# Patient Record
Sex: MALE | Race: BLACK OR AFRICAN AMERICAN | HISPANIC OR LATINO | Employment: FULL TIME | URBAN - METROPOLITAN AREA
[De-identification: names, ages, dates, MRNs, and addresses within clinical notes are randomized per-mention and may not be internally consistent; named-entity substitution may affect disease eponyms.]

---

## 2019-01-17 ENCOUNTER — OFFICE VISIT (OUTPATIENT)
Dept: DERMATOLOGY | Age: 29
End: 2019-01-17

## 2019-01-17 DIAGNOSIS — L74.510 HYPERHIDROSIS OF AXILLA: Primary | ICD-10-CM

## 2019-01-17 PROCEDURE — 99202 OFFICE O/P NEW SF 15 MIN: CPT | Performed by: DERMATOLOGY

## 2019-01-24 ENCOUNTER — TELEPHONE (OUTPATIENT)
Dept: DERMATOLOGY | Age: 29
End: 2019-01-24

## 2019-01-31 ENCOUNTER — TELEPHONE (OUTPATIENT)
Dept: DERMATOLOGY | Age: 29
End: 2019-01-31

## 2019-02-11 ENCOUNTER — TELEPHONE (OUTPATIENT)
Dept: DERMATOLOGY | Age: 29
End: 2019-02-11

## 2019-02-11 RX ORDER — OXYBUTYNIN CHLORIDE 5 MG/1
5 TABLET ORAL 2 TIMES DAILY
Qty: 60 TABLET | Refills: 4 | Status: SHIPPED | OUTPATIENT
Start: 2019-02-11 | End: 2019-06-27 | Stop reason: ALTCHOICE

## 2019-03-12 ENCOUNTER — OFFICE VISIT (OUTPATIENT)
Dept: DERMATOLOGY | Age: 29
End: 2019-03-12

## 2019-03-12 DIAGNOSIS — L73.8 PSEUDOFOLLICULITIS: Primary | ICD-10-CM

## 2019-03-12 PROCEDURE — 99213 OFFICE O/P EST LOW 20 MIN: CPT | Performed by: DERMATOLOGY

## 2019-03-12 PROCEDURE — 11900 INJECT SKIN LESIONS </W 7: CPT | Performed by: DERMATOLOGY

## 2019-03-12 RX ORDER — CLINDAMYCIN PHOSPHATE 10 MG/G
GEL TOPICAL
Qty: 30 G | Refills: 1 | Status: SHIPPED | OUTPATIENT
Start: 2019-03-12 | End: 2019-12-09

## 2019-03-20 ENCOUNTER — TELEPHONE (OUTPATIENT)
Dept: DERMATOLOGY | Age: 29
End: 2019-03-20

## 2019-03-25 ENCOUNTER — OFFICE VISIT (OUTPATIENT)
Dept: DERMATOLOGY | Age: 29
End: 2019-03-25

## 2019-03-25 DIAGNOSIS — L73.8 PSEUDOFOLLICULITIS: Primary | ICD-10-CM

## 2019-03-25 PROCEDURE — 11900 INJECT SKIN LESIONS </W 7: CPT | Performed by: DERMATOLOGY

## 2019-03-25 PROCEDURE — 99213 OFFICE O/P EST LOW 20 MIN: CPT | Performed by: DERMATOLOGY

## 2019-04-09 ENCOUNTER — LAB SERVICES (OUTPATIENT)
Dept: INTERNAL MEDICINE | Age: 29
End: 2019-04-09

## 2019-04-09 ENCOUNTER — OFFICE VISIT (OUTPATIENT)
Dept: INTERNAL MEDICINE | Age: 29
End: 2019-04-09

## 2019-04-09 VITALS
WEIGHT: 168 LBS | SYSTOLIC BLOOD PRESSURE: 120 MMHG | BODY MASS INDEX: 23.52 KG/M2 | HEIGHT: 71 IN | HEART RATE: 80 BPM | DIASTOLIC BLOOD PRESSURE: 84 MMHG

## 2019-04-09 DIAGNOSIS — Z00.00 ANNUAL PHYSICAL EXAM: ICD-10-CM

## 2019-04-09 DIAGNOSIS — Z11.3 SCREENING FOR STDS (SEXUALLY TRANSMITTED DISEASES): Primary | ICD-10-CM

## 2019-04-09 DIAGNOSIS — Z11.3 SCREENING FOR STDS (SEXUALLY TRANSMITTED DISEASES): ICD-10-CM

## 2019-04-09 PROCEDURE — 87591 N.GONORRHOEAE DNA AMP PROB: CPT | Performed by: INTERNAL MEDICINE

## 2019-04-09 PROCEDURE — 87491 CHLMYD TRACH DNA AMP PROBE: CPT | Performed by: INTERNAL MEDICINE

## 2019-04-09 PROCEDURE — 87661 TRICHOMONAS VAGINALIS AMPLIF: CPT | Performed by: INTERNAL MEDICINE

## 2019-04-09 PROCEDURE — 99385 PREV VISIT NEW AGE 18-39: CPT | Performed by: INTERNAL MEDICINE

## 2019-04-09 ASSESSMENT — PATIENT HEALTH QUESTIONNAIRE - PHQ9
SUM OF ALL RESPONSES TO PHQ9 QUESTIONS 1 AND 2: 0
SUM OF ALL RESPONSES TO PHQ9 QUESTIONS 1 AND 2: 0
2. FEELING DOWN, DEPRESSED OR HOPELESS: NOT AT ALL
1. LITTLE INTEREST OR PLEASURE IN DOING THINGS: NOT AT ALL

## 2019-04-10 LAB
C TRACH RRNA SPEC QL NAA+PROBE: NEGATIVE
N GONORRHOEA RRNA SPEC QL NAA+PROBE: NEGATIVE
SPECIMEN SOURCE: NORMAL
SPECIMEN SOURCE: NORMAL
T VAGINALIS RRNA SPEC QL NAA+PROBE: NEGATIVE

## 2019-04-11 ENCOUNTER — TELEPHONE (OUTPATIENT)
Dept: INTERNAL MEDICINE | Age: 29
End: 2019-04-11

## 2019-04-15 ENCOUNTER — TELEPHONE (OUTPATIENT)
Dept: TELEHEALTH | Age: 29
End: 2019-04-15

## 2019-04-15 ENCOUNTER — E-ADVICE (OUTPATIENT)
Dept: DERMATOLOGY | Age: 29
End: 2019-04-15

## 2019-04-15 ENCOUNTER — WALK IN (OUTPATIENT)
Dept: URGENT CARE | Age: 29
End: 2019-04-15

## 2019-04-15 VITALS
DIASTOLIC BLOOD PRESSURE: 70 MMHG | HEART RATE: 55 BPM | TEMPERATURE: 98.2 F | OXYGEN SATURATION: 100 % | SYSTOLIC BLOOD PRESSURE: 126 MMHG | RESPIRATION RATE: 16 BRPM

## 2019-04-15 DIAGNOSIS — K13.0 LIP LESION: Primary | ICD-10-CM

## 2019-04-15 DIAGNOSIS — R61 HYPERHIDROSIS: ICD-10-CM

## 2019-04-15 PROCEDURE — 99213 OFFICE O/P EST LOW 20 MIN: CPT | Performed by: PHYSICIAN ASSISTANT

## 2019-04-15 RX ORDER — VALACYCLOVIR HYDROCHLORIDE 1 G/1
2000 TABLET, FILM COATED ORAL EVERY 12 HOURS
Qty: 4 TABLET | Refills: 1 | Status: SHIPPED | OUTPATIENT
Start: 2019-04-15 | End: 2019-04-16

## 2019-04-15 ASSESSMENT — ENCOUNTER SYMPTOMS
SORE THROAT: 0
COUGH: 0
CHILLS: 0

## 2019-04-16 ENCOUNTER — E-ADVICE (OUTPATIENT)
Dept: DERMATOLOGY | Age: 29
End: 2019-04-16

## 2019-04-17 ENCOUNTER — TELEPHONE (OUTPATIENT)
Dept: DERMATOLOGY | Age: 29
End: 2019-04-17

## 2019-04-23 ENCOUNTER — TELEPHONE (OUTPATIENT)
Dept: DERMATOLOGY | Age: 29
End: 2019-04-23

## 2019-04-30 ENCOUNTER — E-ADVICE (OUTPATIENT)
Dept: DERMATOLOGY | Age: 29
End: 2019-04-30

## 2019-05-02 ENCOUNTER — OFFICE VISIT (OUTPATIENT)
Dept: DERMATOLOGY | Age: 29
End: 2019-05-02

## 2019-05-02 DIAGNOSIS — L74.510 HYPERHIDROSIS OF AXILLA: Primary | ICD-10-CM

## 2019-05-02 PROCEDURE — 64650 CHEMODENERV ECCRINE GLANDS: CPT | Performed by: DERMATOLOGY

## 2019-06-23 ENCOUNTER — WALK IN (OUTPATIENT)
Dept: URGENT CARE | Age: 29
End: 2019-06-23

## 2019-06-23 VITALS
OXYGEN SATURATION: 100 % | DIASTOLIC BLOOD PRESSURE: 66 MMHG | HEART RATE: 88 BPM | SYSTOLIC BLOOD PRESSURE: 120 MMHG | TEMPERATURE: 98.5 F | RESPIRATION RATE: 16 BRPM

## 2019-06-23 DIAGNOSIS — S86.011A RUPTURE OF RIGHT ACHILLES TENDON, INITIAL ENCOUNTER: Primary | ICD-10-CM

## 2019-06-23 DIAGNOSIS — S89.90XA INJURY OF CALF: ICD-10-CM

## 2019-06-23 PROCEDURE — 99202 OFFICE O/P NEW SF 15 MIN: CPT | Performed by: FAMILY MEDICINE

## 2019-06-23 RX ORDER — IBUPROFEN 800 MG/1
800 TABLET ORAL ONCE
Status: COMPLETED | OUTPATIENT
Start: 2019-06-23 | End: 2019-06-23

## 2019-06-23 RX ORDER — ACETAMINOPHEN AND CODEINE PHOSPHATE 300; 30 MG/1; MG/1
1-2 TABLET ORAL EVERY 6 HOURS PRN
Qty: 30 TABLET | Refills: 0 | Status: SHIPPED | OUTPATIENT
Start: 2019-06-23 | End: 2019-12-09

## 2019-06-23 RX ADMIN — IBUPROFEN 800 MG: 800 TABLET ORAL at 14:00

## 2019-06-23 ASSESSMENT — PAIN SCALES - GENERAL: PAIN_LEVEL_AT_REST: 8

## 2019-06-24 ENCOUNTER — TELEPHONE (OUTPATIENT)
Dept: URGENT CARE | Age: 29
End: 2019-06-24

## 2019-06-25 ENCOUNTER — TELEPHONE (OUTPATIENT)
Dept: INTERNAL MEDICINE | Age: 29
End: 2019-06-25

## 2019-06-26 ENCOUNTER — OFFICE VISIT (OUTPATIENT)
Dept: ORTHOPEDICS | Age: 29
End: 2019-06-26
Attending: FAMILY MEDICINE

## 2019-06-26 DIAGNOSIS — S86.011A RUPTURE OF RIGHT ACHILLES TENDON, INITIAL ENCOUNTER: Primary | ICD-10-CM

## 2019-06-26 PROCEDURE — 99203 OFFICE O/P NEW LOW 30 MIN: CPT | Performed by: ORTHOPAEDIC SURGERY

## 2019-06-27 ASSESSMENT — ENCOUNTER SYMPTOMS
PSYCHIATRIC NEGATIVE: 1
EYES NEGATIVE: 1
BRUISES/BLEEDS EASILY: 1
DIAPHORESIS: 1
RESPIRATORY NEGATIVE: 1
CONSTIPATION: 1
ENDOCRINE NEGATIVE: 1

## 2019-06-30 ENCOUNTER — ANESTHESIA EVENT (OUTPATIENT)
Dept: SURGERY | Age: 29
End: 2019-06-30

## 2019-07-01 ENCOUNTER — HOSPITAL ENCOUNTER (OUTPATIENT)
Age: 29
Discharge: HOME | End: 2019-07-01
Attending: ORTHOPAEDIC SURGERY | Admitting: ORTHOPAEDIC SURGERY

## 2019-07-01 ENCOUNTER — ANESTHESIA (OUTPATIENT)
Dept: SURGERY | Age: 29
End: 2019-07-01

## 2019-07-01 VITALS
HEART RATE: 58 BPM | OXYGEN SATURATION: 100 % | HEIGHT: 71 IN | BODY MASS INDEX: 23.24 KG/M2 | RESPIRATION RATE: 18 BRPM | SYSTOLIC BLOOD PRESSURE: 140 MMHG | WEIGHT: 166.01 LBS | TEMPERATURE: 97.9 F | DIASTOLIC BLOOD PRESSURE: 72 MMHG

## 2019-07-01 LAB
INR PPP: 1.1
PROTHROMBIN TIME: 11 SEC (ref 9.7–11.8)

## 2019-07-01 PROCEDURE — 10002358 HB ANESTH GENERAL 1ST 1/2 HR: Performed by: ORTHOPAEDIC SURGERY

## 2019-07-01 PROCEDURE — 10002117 HB ADDITIONAL SURGERY TIME/30 MIN: Performed by: ORTHOPAEDIC SURGERY

## 2019-07-01 PROCEDURE — 27650 REPAIR ACHILLES TENDON: CPT | Performed by: ANESTHESIOLOGY

## 2019-07-01 PROCEDURE — 10004348 HB COUNTER-EVAL PRE-OP: Performed by: ORTHOPAEDIC SURGERY

## 2019-07-01 PROCEDURE — 10001383 HB ANKLE/FOOT PROCEDURES 2: Performed by: ORTHOPAEDIC SURGERY

## 2019-07-01 PROCEDURE — 85610 PROTHROMBIN TIME: CPT

## 2019-07-01 PROCEDURE — 10002801 HB RX 250 W/O HCPCS: Performed by: ORTHOPAEDIC SURGERY

## 2019-07-01 PROCEDURE — 10002801 HB RX 250 W/O HCPCS: Performed by: ANESTHESIOLOGIST ASSISTANT

## 2019-07-01 PROCEDURE — 10004560 HB COUNTER-EXTENDED RECOVERY PER HOUR: Performed by: ORTHOPAEDIC SURGERY

## 2019-07-01 PROCEDURE — 10002803 HB RX 637: Performed by: ORTHOPAEDIC SURGERY

## 2019-07-01 PROCEDURE — 10004316 HB COUNTER-PREP: Performed by: ORTHOPAEDIC SURGERY

## 2019-07-01 PROCEDURE — 10004451 HB PACU RECOVERY 1ST 30 MINUTES: Performed by: ORTHOPAEDIC SURGERY

## 2019-07-01 PROCEDURE — 10002807 HB RX 258: Performed by: ORTHOPAEDIC SURGERY

## 2019-07-01 PROCEDURE — 10002800 HB RX 250 W HCPCS: Performed by: ANESTHESIOLOGIST ASSISTANT

## 2019-07-01 PROCEDURE — 36415 COLL VENOUS BLD VENIPUNCTURE: CPT

## 2019-07-01 PROCEDURE — 10002767 HB EXTENDED RECOVERY PER HOUR: Performed by: ORTHOPAEDIC SURGERY

## 2019-07-01 PROCEDURE — 10002807 HB RX 258: Performed by: ANESTHESIOLOGY

## 2019-07-01 PROCEDURE — 10004452 HB PACU ADDL 30 MINUTES: Performed by: ORTHOPAEDIC SURGERY

## 2019-07-01 PROCEDURE — 10002800 HB RX 250 W HCPCS: Performed by: ANESTHESIOLOGY

## 2019-07-01 PROCEDURE — 10004651 HB RX, NO CHARGE ITEM: Performed by: ORTHOPAEDIC SURGERY

## 2019-07-01 PROCEDURE — 27650 REPAIR ACHILLES TENDON: CPT | Performed by: ANESTHESIOLOGIST ASSISTANT

## 2019-07-01 PROCEDURE — 10002359 HB ANESTH GENERAL ADD'L 1/2 HR: Performed by: ORTHOPAEDIC SURGERY

## 2019-07-01 PROCEDURE — 10002800 HB RX 250 W HCPCS: Performed by: ORTHOPAEDIC SURGERY

## 2019-07-01 RX ORDER — OXYCODONE HYDROCHLORIDE 5 MG/1
5-15 TABLET ORAL
Qty: 40 TABLET | Refills: 0 | Status: SHIPPED | OUTPATIENT
Start: 2019-07-01 | End: 2019-12-09

## 2019-07-01 RX ORDER — 0.9 % SODIUM CHLORIDE 0.9 %
2 VIAL (ML) INJECTION EVERY 12 HOURS SCHEDULED
Status: DISCONTINUED | OUTPATIENT
Start: 2019-07-01 | End: 2019-07-01 | Stop reason: HOSPADM

## 2019-07-01 RX ORDER — NALOXONE HCL 0.4 MG/ML
0.2 VIAL (ML) INJECTION EVERY 5 MIN PRN
Status: DISCONTINUED | OUTPATIENT
Start: 2019-07-01 | End: 2019-07-01 | Stop reason: HOSPADM

## 2019-07-01 RX ORDER — SODIUM CHLORIDE, SODIUM LACTATE, POTASSIUM CHLORIDE, CALCIUM CHLORIDE 600; 310; 30; 20 MG/100ML; MG/100ML; MG/100ML; MG/100ML
INJECTION, SOLUTION INTRAVENOUS CONTINUOUS
Status: DISCONTINUED | OUTPATIENT
Start: 2019-07-01 | End: 2019-07-01 | Stop reason: HOSPADM

## 2019-07-01 RX ORDER — MIDAZOLAM HYDROCHLORIDE 1 MG/ML
INJECTION, SOLUTION INTRAMUSCULAR; INTRAVENOUS PRN
Status: DISCONTINUED | OUTPATIENT
Start: 2019-07-01 | End: 2019-07-01

## 2019-07-01 RX ORDER — CEFAZOLIN SODIUM/WATER 2 G/20 ML
2000 SYRINGE (ML) INTRAVENOUS ONCE
Status: COMPLETED | OUTPATIENT
Start: 2019-07-01 | End: 2019-07-01

## 2019-07-01 RX ORDER — ONDANSETRON 2 MG/ML
INJECTION INTRAMUSCULAR; INTRAVENOUS PRN
Status: DISCONTINUED | OUTPATIENT
Start: 2019-07-01 | End: 2019-07-01

## 2019-07-01 RX ORDER — LIDOCAINE HYDROCHLORIDE 10 MG/ML
INJECTION, SOLUTION INFILTRATION; PERINEURAL PRN
Status: DISCONTINUED | OUTPATIENT
Start: 2019-07-01 | End: 2019-07-01

## 2019-07-01 RX ORDER — ONDANSETRON 2 MG/ML
4 INJECTION INTRAMUSCULAR; INTRAVENOUS 2 TIMES DAILY PRN
Status: DISCONTINUED | OUTPATIENT
Start: 2019-07-01 | End: 2019-07-01 | Stop reason: HOSPADM

## 2019-07-01 RX ORDER — LIDOCAINE HYDROCHLORIDE 40 MG/ML
SOLUTION TOPICAL PRN
Status: DISCONTINUED | OUTPATIENT
Start: 2019-07-01 | End: 2019-07-01

## 2019-07-01 RX ORDER — DIPHENHYDRAMINE HYDROCHLORIDE 50 MG/ML
25 INJECTION INTRAMUSCULAR; INTRAVENOUS EVERY 4 HOURS PRN
Status: DISCONTINUED | OUTPATIENT
Start: 2019-07-01 | End: 2019-07-01 | Stop reason: HOSPADM

## 2019-07-01 RX ORDER — DEXTROSE MONOHYDRATE 50 MG/ML
INJECTION, SOLUTION INTRAVENOUS CONTINUOUS PRN
Status: DISCONTINUED | OUTPATIENT
Start: 2019-07-01 | End: 2019-07-01 | Stop reason: HOSPADM

## 2019-07-01 RX ORDER — ROCURONIUM BROMIDE 10 MG/ML
INJECTION, SOLUTION INTRAVENOUS PRN
Status: DISCONTINUED | OUTPATIENT
Start: 2019-07-01 | End: 2019-07-01

## 2019-07-01 RX ORDER — ACETAMINOPHEN 325 MG/1
650 TABLET ORAL ONCE
Status: COMPLETED | OUTPATIENT
Start: 2019-07-01 | End: 2019-07-01

## 2019-07-01 RX ORDER — CELECOXIB 200 MG/1
200 CAPSULE ORAL ONCE
Status: COMPLETED | OUTPATIENT
Start: 2019-07-01 | End: 2019-07-01

## 2019-07-01 RX ORDER — OXYCODONE HYDROCHLORIDE AND ACETAMINOPHEN 5; 325 MG/1; MG/1
1-2 TABLET ORAL EVERY 4 HOURS PRN
Status: DISCONTINUED | OUTPATIENT
Start: 2019-07-01 | End: 2019-07-01 | Stop reason: HOSPADM

## 2019-07-01 RX ORDER — ACETAMINOPHEN 325 MG/1
650 TABLET ORAL EVERY 4 HOURS PRN
Status: DISCONTINUED | OUTPATIENT
Start: 2019-07-01 | End: 2019-07-01 | Stop reason: HOSPADM

## 2019-07-01 RX ORDER — LIDOCAINE HYDROCHLORIDE 10 MG/ML
5-10 INJECTION, SOLUTION INFILTRATION; PERINEURAL PRN
Status: DISCONTINUED | OUTPATIENT
Start: 2019-07-01 | End: 2019-07-01 | Stop reason: HOSPADM

## 2019-07-01 RX ORDER — DIPHENHYDRAMINE HYDROCHLORIDE 50 MG/ML
25 INJECTION INTRAMUSCULAR; INTRAVENOUS
Status: DISCONTINUED | OUTPATIENT
Start: 2019-07-01 | End: 2019-07-01 | Stop reason: HOSPADM

## 2019-07-01 RX ORDER — HUMAN INSULIN 100 [IU]/ML
INJECTION, SOLUTION SUBCUTANEOUS
Status: DISCONTINUED | OUTPATIENT
Start: 2019-07-01 | End: 2019-07-01 | Stop reason: HOSPADM

## 2019-07-01 RX ORDER — LIDOCAINE AND PRILOCAINE 25; 25 MG/G; MG/G
1 CREAM TOPICAL PRN
Status: DISCONTINUED | OUTPATIENT
Start: 2019-07-01 | End: 2019-07-01 | Stop reason: HOSPADM

## 2019-07-01 RX ORDER — METOCLOPRAMIDE HYDROCHLORIDE 5 MG/ML
5 INJECTION INTRAMUSCULAR; INTRAVENOUS EVERY 6 HOURS PRN
Status: DISCONTINUED | OUTPATIENT
Start: 2019-07-01 | End: 2019-07-01 | Stop reason: HOSPADM

## 2019-07-01 RX ORDER — PROPOFOL 10 MG/ML
INJECTION, EMULSION INTRAVENOUS PRN
Status: DISCONTINUED | OUTPATIENT
Start: 2019-07-01 | End: 2019-07-01

## 2019-07-01 RX ORDER — PROCHLORPERAZINE EDISYLATE 5 MG/ML
5 INJECTION INTRAMUSCULAR; INTRAVENOUS EVERY 4 HOURS PRN
Status: DISCONTINUED | OUTPATIENT
Start: 2019-07-01 | End: 2019-07-01 | Stop reason: HOSPADM

## 2019-07-01 RX ORDER — DIPHENHYDRAMINE HCL 25 MG
25 CAPSULE ORAL EVERY 6 HOURS PRN
Status: DISCONTINUED | OUTPATIENT
Start: 2019-07-01 | End: 2019-07-01 | Stop reason: HOSPADM

## 2019-07-01 RX ORDER — 0.9 % SODIUM CHLORIDE 0.9 %
2 VIAL (ML) INJECTION PRN
Status: DISCONTINUED | OUTPATIENT
Start: 2019-07-01 | End: 2019-07-01 | Stop reason: HOSPADM

## 2019-07-01 RX ORDER — BUPIVACAINE HYDROCHLORIDE AND EPINEPHRINE 2.5; 5 MG/ML; UG/ML
INJECTION, SOLUTION EPIDURAL; INFILTRATION; INTRACAUDAL; PERINEURAL PRN
Status: DISCONTINUED | OUTPATIENT
Start: 2019-07-01 | End: 2019-07-01 | Stop reason: HOSPADM

## 2019-07-01 RX ADMIN — TAPENTADOL HYDROCHLORIDE 100 MG: 50 TABLET, FILM COATED, EXTENDED RELEASE ORAL at 09:04

## 2019-07-01 RX ADMIN — FENTANYL CITRATE 25 MCG: 50 INJECTION INTRAMUSCULAR; INTRAVENOUS at 13:09

## 2019-07-01 RX ADMIN — Medication 2000 MG: at 11:31

## 2019-07-01 RX ADMIN — LIDOCAINE HYDROCHLORIDE 50 MG: 10 INJECTION, SOLUTION INFILTRATION; PERINEURAL at 11:22

## 2019-07-01 RX ADMIN — FENTANYL CITRATE 25 MCG: 50 INJECTION INTRAMUSCULAR; INTRAVENOUS at 13:18

## 2019-07-01 RX ADMIN — LIDOCAINE HYDROCHLORIDE 1 APPLICATION.: 40 SOLUTION TOPICAL at 11:27

## 2019-07-01 RX ADMIN — FENTANYL CITRATE 25 MCG: 50 INJECTION INTRAMUSCULAR; INTRAVENOUS at 13:35

## 2019-07-01 RX ADMIN — ONDANSETRON 4 MG: 2 INJECTION INTRAMUSCULAR; INTRAVENOUS at 12:15

## 2019-07-01 RX ADMIN — FENTANYL CITRATE 100 MCG: 50 INJECTION INTRAMUSCULAR; INTRAVENOUS at 11:20

## 2019-07-01 RX ADMIN — SODIUM CHLORIDE, POTASSIUM CHLORIDE, SODIUM LACTATE AND CALCIUM CHLORIDE: 600; 310; 30; 20 INJECTION, SOLUTION INTRAVENOUS at 10:26

## 2019-07-01 RX ADMIN — ROCURONIUM BROMIDE 70 MG: 10 INJECTION INTRAVENOUS at 11:24

## 2019-07-01 RX ADMIN — ACETAMINOPHEN 650 MG: 325 TABLET ORAL at 09:03

## 2019-07-01 RX ADMIN — MIDAZOLAM HYDROCHLORIDE 2 MG: 1 INJECTION, SOLUTION INTRAMUSCULAR; INTRAVENOUS at 11:16

## 2019-07-01 RX ADMIN — SUGAMMADEX 200 MG: 100 INJECTION, SOLUTION INTRAVENOUS at 12:41

## 2019-07-01 RX ADMIN — CEFAZOLIN 1000 MG: 1 INJECTION, POWDER, FOR SOLUTION INTRAMUSCULAR; INTRAVENOUS; PARENTERAL at 13:14

## 2019-07-01 RX ADMIN — PROPOFOL 200 MG: 10 INJECTION, EMULSION INTRAVENOUS at 11:23

## 2019-07-01 RX ADMIN — FENTANYL CITRATE 25 MCG: 50 INJECTION INTRAMUSCULAR; INTRAVENOUS at 13:30

## 2019-07-01 RX ADMIN — SODIUM CHLORIDE, POTASSIUM CHLORIDE, SODIUM LACTATE AND CALCIUM CHLORIDE: 600; 310; 30; 20 INJECTION, SOLUTION INTRAVENOUS at 12:44

## 2019-07-01 RX ADMIN — PROPOFOL 50 MCG/KG/MIN: 10 INJECTION, EMULSION INTRAVENOUS at 11:34

## 2019-07-01 RX ADMIN — CELECOXIB 200 MG: 200 CAPSULE ORAL at 09:04

## 2019-07-01 ASSESSMENT — PAIN SCALES - GENERAL
PAIN_LEVEL_AT_REST: 0
PAIN_LEVEL_AT_REST: 0
PAIN_LEVEL_AT_REST: 3
PAIN_LEVEL_AT_REST: 5
PAIN_LEVEL_AT_REST: 0
PAIN_LEVEL_AT_REST: 3
PAIN_LEVEL_AT_REST: 4
PAIN_LEVEL_AT_REST: 4
PAIN_LEVEL_AT_REST: 3
PAIN_LEVEL_AT_REST: 4

## 2019-07-01 ASSESSMENT — MOVEMENT AND STRENGTH ASSESSMENTS
RLE_ASSESSMENT: TRACE/PALPABLE CONTRACTION OF MUSCLE, NO JOINT MOTION
RLE_ASSESSMENT: TRACE/PALPABLE CONTRACTION OF MUSCLE, NO JOINT MOTION
LLE_ASSESSMENT: NORMAL/COMPLETE ROM AGAINST GRAVITY WITH FULL RESISTANCE
LLE_ASSESSMENT: NORMAL/COMPLETE ROM AGAINST GRAVITY WITH FULL RESISTANCE

## 2019-07-01 ASSESSMENT — ACTIVITIES OF DAILY LIVING (ADL)
HISTORY OF FALLING IN THE LAST YEAR (PRIOR TO ADMISSION): NO
ADL_SHORT_OF_BREATH: NO
RECENT_DECLINE_ADL: NO
ADL_BEFORE_ADMISSION: INDEPENDENT
NEEDS_ASSIST: NO
CHRONIC_PAIN_PRESENT: NO
MOBILITY_ASSIST_DEVICES: CRUTCHES;WHEELCHAIR
ADL_SCORE: 12

## 2019-07-01 ASSESSMENT — COGNITIVE AND FUNCTIONAL STATUS - GENERAL
ARE YOU BLIND OR DO YOU HAVE SERIOUS DIFFICULTY SEEING, EVEN WHEN WEARING GLASSES: NO
ARE YOU DEAF OR DO YOU HAVE SERIOUS DIFFICULTY  HEARING: NO

## 2019-07-25 ENCOUNTER — HOSPITAL ENCOUNTER (OUTPATIENT)
Dept: REHABILITATION | Age: 29
Discharge: STILL ADMITTED OR ADMINISTRATIVE | End: 2019-07-25
Attending: ORTHOPAEDIC SURGERY

## 2019-07-25 DIAGNOSIS — S86.011A ACHILLES TENDON TEAR, RIGHT, INITIAL ENCOUNTER: ICD-10-CM

## 2019-07-25 DIAGNOSIS — S86.011A ACHILLES TENDON TEAR, RIGHT, INITIAL ENCOUNTER: Primary | ICD-10-CM

## 2019-07-25 PROCEDURE — 10004173 HB COUNTER-THERAPY VISIT PT: Performed by: PHYSICAL THERAPIST

## 2019-07-25 PROCEDURE — 97110 THERAPEUTIC EXERCISES: CPT | Performed by: PHYSICAL THERAPIST

## 2019-07-25 PROCEDURE — 97162 PT EVAL MOD COMPLEX 30 MIN: CPT | Performed by: PHYSICAL THERAPIST

## 2019-07-25 ASSESSMENT — MOVEMENT AND STRENGTH ASSESSMENTS
RUNNING ON EVEN GROUND: EXTREME DIFFICULTY OR UNABLE TO PERFORM ACTIVITY
ROLLING OVER IN BED: NO DIFFICULTY
WALKING A MILE: EXTREME DIFFICULTY OR UNABLE TO PERFORM ACTIVITY
RUNNING ON UNEVEN GROUND: EXTREME DIFFICULTY OR UNABLE TO PERFORM ACTIVITY
TOTAL SCORE: 40
LIFTING AN OBJECT, LIKE A BAG OF GROCERIES, FROM THE FLOOR: A LITTLE BIT OF DIFFICULTY
WALKING BETWEEN ROOMS: NO DIFFICULTY
PERFORMING LIGHT ACTIVITES AROUND YOUR HOME: A LITTLE BIT OF DIFFICULTY
STANDING FOR 1 HOUR: EXTREME DIFFICULTY OR UNABLE TO PERFORM ACTIVITY
SQUATTING: QUITE A BIT OF DIFFICULTY
WALKING 2 BLOCKS: EXTREME DIFFICULTY OR UNABLE TO PERFORM ACTIVITY
MAKING SHARP TURNS WHILE RUNNING FAST: EX
GETTING INTO OR OUT OF A CAR: MODERATE DIFFICULTY
PERFORMING HEAVY ACTIVITIES AROUND YOUR HOME: MODERATE DIFFICULTY
PUTTING ON YOUR SHOES OR SOCKS: A LITTLE BIT OF DIFFICULTY
GETTING INTO OR OUT OF THE BATH: A LITTLE BIT OF DIFFICULTY
SITTING FOR 1 HOUR: NO DIFFICULTY
YOUR USUAL HOBBIES, RECREATIONAL OR SPORTING ACTIVIITIES: EXTREME DIFFICULTY OR UNABLE TO PERFORM ACTIVITY
HOPPING: EXTREME DIFFICULTY OR UNABLE TO PERFORM ACTIVITY
GOING UP OR DOWN 10 STAIRS (ABOUT 1 FLIGHT OF STAIRS): QUITE A BIT OF DIFFICULTY
ANY OF YOUR USUAL WORK, HOUSEWORK OR SCHOOL ACTIVITIES: MODERATE DIFFICULTY

## 2019-07-31 ENCOUNTER — HOSPITAL ENCOUNTER (OUTPATIENT)
Dept: REHABILITATION | Age: 29
Discharge: STILL ADMITTED OR ADMINISTRATIVE | End: 2019-07-31
Attending: ORTHOPAEDIC SURGERY

## 2019-07-31 PROCEDURE — 97110 THERAPEUTIC EXERCISES: CPT | Performed by: PHYSICAL THERAPIST

## 2019-07-31 PROCEDURE — 10004173 HB COUNTER-THERAPY VISIT PT: Performed by: PHYSICAL THERAPIST

## 2019-07-31 PROCEDURE — 97140 MANUAL THERAPY 1/> REGIONS: CPT | Performed by: PHYSICAL THERAPIST

## 2019-08-02 ENCOUNTER — HOSPITAL ENCOUNTER (OUTPATIENT)
Dept: REHABILITATION | Age: 29
Discharge: STILL ADMITTED OR ADMINISTRATIVE | End: 2019-08-02
Attending: ORTHOPAEDIC SURGERY

## 2019-08-02 PROCEDURE — 97110 THERAPEUTIC EXERCISES: CPT | Performed by: PHYSICAL THERAPIST

## 2019-08-02 PROCEDURE — 10004173 HB COUNTER-THERAPY VISIT PT: Performed by: PHYSICAL THERAPIST

## 2019-08-06 ENCOUNTER — HOSPITAL ENCOUNTER (OUTPATIENT)
Dept: REHABILITATION | Age: 29
Discharge: STILL ADMITTED OR ADMINISTRATIVE | End: 2019-08-06
Attending: ORTHOPAEDIC SURGERY

## 2019-08-06 PROCEDURE — 10004173 HB COUNTER-THERAPY VISIT PT: Performed by: PHYSICAL THERAPIST

## 2019-08-06 PROCEDURE — 97110 THERAPEUTIC EXERCISES: CPT | Performed by: PHYSICAL THERAPIST

## 2019-08-07 ENCOUNTER — TELEPHONE (OUTPATIENT)
Dept: DERMATOLOGY | Age: 29
End: 2019-08-07

## 2019-08-08 ENCOUNTER — HOSPITAL ENCOUNTER (OUTPATIENT)
Dept: REHABILITATION | Age: 29
Discharge: STILL ADMITTED OR ADMINISTRATIVE | End: 2019-08-08
Attending: ORTHOPAEDIC SURGERY

## 2019-08-08 PROCEDURE — 10004173 HB COUNTER-THERAPY VISIT PT: Performed by: PHYSICAL THERAPIST

## 2019-08-08 PROCEDURE — 97110 THERAPEUTIC EXERCISES: CPT | Performed by: PHYSICAL THERAPIST

## 2019-08-08 PROCEDURE — 97140 MANUAL THERAPY 1/> REGIONS: CPT | Performed by: PHYSICAL THERAPIST

## 2019-08-11 ENCOUNTER — TELEPHONE (OUTPATIENT)
Dept: URGENT CARE | Age: 29
End: 2019-08-11

## 2019-08-12 ENCOUNTER — OFFICE VISIT (OUTPATIENT)
Dept: DERMATOLOGY | Age: 29
End: 2019-08-12

## 2019-08-12 DIAGNOSIS — L74.510 HYPERHIDROSIS OF AXILLA: Primary | ICD-10-CM

## 2019-08-12 PROCEDURE — 64650 CHEMODENERV ECCRINE GLANDS: CPT | Performed by: DERMATOLOGY

## 2019-08-13 ENCOUNTER — HOSPITAL ENCOUNTER (OUTPATIENT)
Dept: REHABILITATION | Age: 29
Discharge: STILL ADMITTED OR ADMINISTRATIVE | End: 2019-08-13
Attending: ORTHOPAEDIC SURGERY

## 2019-08-13 PROCEDURE — 97110 THERAPEUTIC EXERCISES: CPT | Performed by: PHYSICAL THERAPIST

## 2019-08-13 PROCEDURE — 10004173 HB COUNTER-THERAPY VISIT PT: Performed by: PHYSICAL THERAPIST

## 2019-08-15 ENCOUNTER — TELEPHONE (OUTPATIENT)
Dept: GENERAL RADIOLOGY | Age: 29
End: 2019-08-15

## 2019-08-15 ENCOUNTER — HOSPITAL ENCOUNTER (OUTPATIENT)
Dept: REHABILITATION | Age: 29
Discharge: STILL ADMITTED OR ADMINISTRATIVE | End: 2019-08-15
Attending: ORTHOPAEDIC SURGERY

## 2019-08-15 PROCEDURE — 10004173 HB COUNTER-THERAPY VISIT PT: Performed by: PHYSICAL THERAPIST

## 2019-08-15 PROCEDURE — 97110 THERAPEUTIC EXERCISES: CPT | Performed by: PHYSICAL THERAPIST

## 2019-08-20 ENCOUNTER — APPOINTMENT (OUTPATIENT)
Dept: REHABILITATION | Age: 29
End: 2019-08-20
Attending: ORTHOPAEDIC SURGERY

## 2019-08-22 ENCOUNTER — HOSPITAL ENCOUNTER (OUTPATIENT)
Dept: REHABILITATION | Age: 29
Discharge: STILL ADMITTED OR ADMINISTRATIVE | End: 2019-08-22
Attending: ORTHOPAEDIC SURGERY

## 2019-08-22 PROCEDURE — 10004173 HB COUNTER-THERAPY VISIT PT: Performed by: PHYSICAL THERAPIST

## 2019-08-22 PROCEDURE — 97110 THERAPEUTIC EXERCISES: CPT | Performed by: PHYSICAL THERAPIST

## 2019-08-28 ENCOUNTER — HOSPITAL ENCOUNTER (OUTPATIENT)
Dept: REHABILITATION | Age: 29
Discharge: STILL ADMITTED OR ADMINISTRATIVE | End: 2019-08-28
Attending: ORTHOPAEDIC SURGERY

## 2019-08-28 PROCEDURE — 97112 NEUROMUSCULAR REEDUCATION: CPT | Performed by: PHYSICAL THERAPIST

## 2019-08-28 PROCEDURE — 97140 MANUAL THERAPY 1/> REGIONS: CPT | Performed by: PHYSICAL THERAPIST

## 2019-08-28 PROCEDURE — 10004173 HB COUNTER-THERAPY VISIT PT: Performed by: PHYSICAL THERAPIST

## 2019-08-28 PROCEDURE — 97110 THERAPEUTIC EXERCISES: CPT | Performed by: PHYSICAL THERAPIST

## 2019-08-30 ENCOUNTER — APPOINTMENT (OUTPATIENT)
Dept: REHABILITATION | Age: 29
End: 2019-08-30
Attending: ORTHOPAEDIC SURGERY

## 2019-09-05 ENCOUNTER — HOSPITAL ENCOUNTER (OUTPATIENT)
Dept: REHABILITATION | Age: 29
Discharge: STILL ADMITTED OR ADMINISTRATIVE | End: 2019-09-05
Attending: ORTHOPAEDIC SURGERY

## 2019-09-05 PROCEDURE — 97110 THERAPEUTIC EXERCISES: CPT | Performed by: PHYSICAL THERAPIST

## 2019-09-05 PROCEDURE — 10004173 HB COUNTER-THERAPY VISIT PT: Performed by: PHYSICAL THERAPIST

## 2019-09-09 ENCOUNTER — HOSPITAL ENCOUNTER (OUTPATIENT)
Dept: REHABILITATION | Age: 29
Discharge: STILL ADMITTED OR ADMINISTRATIVE | End: 2019-09-09
Attending: ORTHOPAEDIC SURGERY

## 2019-09-09 PROCEDURE — 10004173 HB COUNTER-THERAPY VISIT PT: Performed by: PHYSICAL THERAPIST

## 2019-09-09 PROCEDURE — 97110 THERAPEUTIC EXERCISES: CPT | Performed by: PHYSICAL THERAPIST

## 2019-09-10 ENCOUNTER — LAB SERVICES (OUTPATIENT)
Dept: INTERNAL MEDICINE | Age: 29
End: 2019-09-10

## 2019-09-10 ENCOUNTER — OFFICE VISIT (OUTPATIENT)
Dept: INTERNAL MEDICINE | Age: 29
End: 2019-09-10

## 2019-09-10 VITALS
HEART RATE: 91 BPM | SYSTOLIC BLOOD PRESSURE: 110 MMHG | HEIGHT: 71 IN | BODY MASS INDEX: 22.82 KG/M2 | DIASTOLIC BLOOD PRESSURE: 80 MMHG | WEIGHT: 163 LBS

## 2019-09-10 DIAGNOSIS — Z11.3 SCREENING FOR STDS (SEXUALLY TRANSMITTED DISEASES): Primary | ICD-10-CM

## 2019-09-10 DIAGNOSIS — Z11.3 SCREENING FOR STDS (SEXUALLY TRANSMITTED DISEASES): ICD-10-CM

## 2019-09-10 PROCEDURE — 86694 HERPES SIMPLEX NES ANTBDY: CPT | Performed by: INTERNAL MEDICINE

## 2019-09-10 PROCEDURE — 86696 HERPES SIMPLEX TYPE 2 TEST: CPT | Performed by: INTERNAL MEDICINE

## 2019-09-10 PROCEDURE — 36415 COLL VENOUS BLD VENIPUNCTURE: CPT | Performed by: INTERNAL MEDICINE

## 2019-09-10 PROCEDURE — 87389 HIV-1 AG W/HIV-1&-2 AB AG IA: CPT | Performed by: INTERNAL MEDICINE

## 2019-09-10 PROCEDURE — 99213 OFFICE O/P EST LOW 20 MIN: CPT | Performed by: INTERNAL MEDICINE

## 2019-09-10 PROCEDURE — 86695 HERPES SIMPLEX TYPE 1 TEST: CPT | Performed by: INTERNAL MEDICINE

## 2019-09-11 LAB
HIV 1+2 AB+HIV1 P24 AG SERPL QL IA: NONREACTIVE
HSV IGM SER-ACNC: 0.46
HSV1 GG IGG SER-ACNC: POSITIVE
HSV1 IGG SERPL QL IA: POSITIVE
HSV2 GG IGG SER-ACNC: NEGATIVE
HSV2 IGG SERPL QL IA: POSITIVE

## 2019-09-13 ENCOUNTER — E-ADVICE (OUTPATIENT)
Dept: INTERNAL MEDICINE | Age: 29
End: 2019-09-13

## 2019-09-24 ENCOUNTER — HOSPITAL ENCOUNTER (OUTPATIENT)
Dept: REHABILITATION | Age: 29
Discharge: STILL ADMITTED OR ADMINISTRATIVE | End: 2019-09-24
Attending: ORTHOPAEDIC SURGERY

## 2019-09-24 PROCEDURE — 97140 MANUAL THERAPY 1/> REGIONS: CPT | Performed by: PHYSICAL THERAPIST

## 2019-09-24 PROCEDURE — 97110 THERAPEUTIC EXERCISES: CPT | Performed by: PHYSICAL THERAPIST

## 2019-09-24 PROCEDURE — 10004173 HB COUNTER-THERAPY VISIT PT: Performed by: PHYSICAL THERAPIST

## 2019-09-27 ENCOUNTER — APPOINTMENT (OUTPATIENT)
Dept: REHABILITATION | Age: 29
End: 2019-09-27
Attending: ORTHOPAEDIC SURGERY

## 2019-11-05 ENCOUNTER — TELEPHONE (OUTPATIENT)
Dept: TELEHEALTH | Age: 29
End: 2019-11-05

## 2019-11-13 ENCOUNTER — OFFICE VISIT (OUTPATIENT)
Dept: DERMATOLOGY | Age: 29
End: 2019-11-13

## 2019-11-13 DIAGNOSIS — L74.510 HYPERHIDROSIS OF AXILLA: Primary | ICD-10-CM

## 2019-11-13 DIAGNOSIS — T30.0 THERMAL BURN: ICD-10-CM

## 2019-11-13 PROCEDURE — 64650 CHEMODENERV ECCRINE GLANDS: CPT | Performed by: DERMATOLOGY

## 2019-11-13 PROCEDURE — 99212 OFFICE O/P EST SF 10 MIN: CPT | Performed by: DERMATOLOGY

## 2019-12-06 ENCOUNTER — HOSPITAL ENCOUNTER (EMERGENCY)
Age: 29
Discharge: HOME | End: 2019-12-06
Attending: EMERGENCY MEDICINE

## 2019-12-06 ENCOUNTER — APPOINTMENT (OUTPATIENT)
Dept: CT IMAGING | Age: 29
End: 2019-12-06

## 2019-12-06 VITALS
DIASTOLIC BLOOD PRESSURE: 83 MMHG | OXYGEN SATURATION: 100 % | HEART RATE: 67 BPM | TEMPERATURE: 98.5 F | SYSTOLIC BLOOD PRESSURE: 142 MMHG | RESPIRATION RATE: 16 BRPM

## 2019-12-06 DIAGNOSIS — R20.2 TINGLING: Primary | ICD-10-CM

## 2019-12-06 LAB
ALBUMIN SERPL-MCNC: 4.4 G/DL (ref 3.6–5.1)
ALBUMIN/GLOB SERPL: 1.2 {RATIO} (ref 1–2.4)
ALP SERPL-CCNC: 62 UNITS/L (ref 45–117)
ALT SERPL-CCNC: 22 UNITS/L
ANION GAP BLD CALC-SCNC: 14 MMOL/L
ANION GAP SERPL CALC-SCNC: 8 MMOL/L (ref 10–20)
AST SERPL-CCNC: 20 UNITS/L
BASOPHILS # BLD AUTO: 0 K/MCL (ref 0–0.3)
BASOPHILS NFR BLD AUTO: 1 %
BILIRUB SERPL-MCNC: 0.8 MG/DL (ref 0.2–1)
BUN BLD-MCNC: 11 MG/DL (ref 6–20)
BUN SERPL-MCNC: 10 MG/DL (ref 6–20)
BUN/CREAT SERPL: 10 (ref 7–25)
CA-I BLD ISE-SCNC: 1.25 MMOL/L (ref 1.15–1.29)
CALCIUM SERPL-MCNC: 9.2 MG/DL (ref 8.4–10.2)
CHLORIDE BLD-SCNC: 101 MMOL/L (ref 98–107)
CHLORIDE SERPL-SCNC: 106 MMOL/L (ref 98–107)
CO2 BLD-SCNC: 29 MMOL/L (ref 19–24)
CO2 SERPL-SCNC: 29 MMOL/L (ref 21–32)
CREAT BLD-MCNC: 1 MG/DL (ref 0.67–1.17)
CREAT BLD-MCNC: >90 MG/DL
CREAT SERPL-MCNC: 1.01 MG/DL (ref 0.67–1.17)
DIFFERENTIAL METHOD BLD: ABNORMAL
EOSINOPHIL # BLD AUTO: 0 K/MCL (ref 0.1–0.5)
EOSINOPHIL NFR SPEC: 1 %
ERYTHROCYTE [DISTWIDTH] IN BLOOD: 13.1 % (ref 11–15)
GLOBULIN SER-MCNC: 3.8 G/DL (ref 2–4)
GLUCOSE BLD-MCNC: 96 MG/DL (ref 70–99)
GLUCOSE SERPL-MCNC: 95 MG/DL (ref 65–99)
HCT VFR BLD CALC: 45.9 % (ref 39–51)
HCT VFR BLD CALC: 46 % (ref 39–51)
HGB BLD-MCNC: 15.2 G/DL (ref 13–17)
HGB BLD-MCNC: 15.6 G/DL (ref 13–17)
IMM GRANULOCYTES # BLD AUTO: 0 K/MCL (ref 0–0.2)
IMM GRANULOCYTES NFR BLD: 0 %
LYMPHOCYTES # BLD MANUAL: 1.3 K/MCL (ref 1–4.8)
LYMPHOCYTES NFR BLD MANUAL: 37 %
MCH RBC QN AUTO: 29 PG (ref 26–34)
MCHC RBC AUTO-ENTMCNC: 33.1 G/DL (ref 32–36.5)
MCV RBC AUTO: 87.4 FL (ref 78–100)
MONOCYTES # BLD MANUAL: 0.5 K/MCL (ref 0.3–0.9)
MONOCYTES NFR BLD MANUAL: 14 %
NEUTROPHILS # BLD: 1.7 K/MCL (ref 1.8–7.7)
NEUTROPHILS NFR BLD AUTO: 47 %
NRBC BLD MANUAL-RTO: 0 /100 WBC
PLATELET # BLD: 275 K/MCL (ref 140–450)
POTASSIUM BLD-SCNC: 3.9 MMOL/L (ref 3.4–5.1)
POTASSIUM SERPL-SCNC: 4.1 MMOL/L (ref 3.4–5.1)
PROT SERPL-MCNC: 8.2 G/DL (ref 6.4–8.2)
RBC # BLD: 5.25 MIL/MCL (ref 4.5–5.9)
SODIUM BLD-SCNC: 139 MMOL/L (ref 135–145)
SODIUM SERPL-SCNC: 139 MMOL/L (ref 135–145)
TROPONIN I BLD-MCNC: <0.1 NG/ML
TSH SERPL-ACNC: 0.97 MCUNITS/ML (ref 0.35–5)
WBC # BLD: 3.6 K/MCL (ref 4.2–11)

## 2019-12-06 PROCEDURE — 80047 BASIC METABLC PNL IONIZED CA: CPT

## 2019-12-06 PROCEDURE — 84443 ASSAY THYROID STIM HORMONE: CPT

## 2019-12-06 PROCEDURE — 85025 COMPLETE CBC W/AUTO DIFF WBC: CPT

## 2019-12-06 PROCEDURE — 70450 CT HEAD/BRAIN W/O DYE: CPT

## 2019-12-06 PROCEDURE — 93005 ELECTROCARDIOGRAM TRACING: CPT | Performed by: PHYSICIAN ASSISTANT

## 2019-12-06 PROCEDURE — 80053 COMPREHEN METABOLIC PANEL: CPT

## 2019-12-06 PROCEDURE — 99284 EMERGENCY DEPT VISIT MOD MDM: CPT

## 2019-12-06 PROCEDURE — 84484 ASSAY OF TROPONIN QUANT: CPT

## 2019-12-06 PROCEDURE — 36415 COLL VENOUS BLD VENIPUNCTURE: CPT

## 2019-12-06 PROCEDURE — 70450 CT HEAD/BRAIN W/O DYE: CPT | Performed by: RADIOLOGY

## 2019-12-06 ASSESSMENT — ENCOUNTER SYMPTOMS
HEADACHES: 0
NAUSEA: 0
POLYPHAGIA: 0
CONSTIPATION: 0
NUMBNESS: 1
COUGH: 0
VOMITING: 0
DIARRHEA: 0
ABDOMINAL PAIN: 0
SHORTNESS OF BREATH: 0
SORE THROAT: 0
CHILLS: 0
FEVER: 0
DIZZINESS: 0
RHINORRHEA: 0

## 2019-12-06 ASSESSMENT — PAIN SCALES - GENERAL
PAINLEVEL_OUTOF10: 0
PAINLEVEL_OUTOF10: 0

## 2019-12-07 LAB
ATRIAL RATE (BPM): 61
P AXIS (DEGREES): 51
PR-INTERVAL (MSEC): 172
QRS-INTERVAL (MSEC): 108
QT-INTERVAL (MSEC): 376
QTC: 379
R AXIS (DEGREES): 28
REPORT TEXT: NORMAL
T AXIS (DEGREES): 33
VENTRICULAR RATE EKG/MIN (BPM): 61

## 2019-12-09 ENCOUNTER — OFFICE VISIT (OUTPATIENT)
Dept: INTERNAL MEDICINE | Age: 29
End: 2019-12-09

## 2019-12-09 ENCOUNTER — LAB SERVICES (OUTPATIENT)
Dept: INTERNAL MEDICINE | Age: 29
End: 2019-12-09

## 2019-12-09 VITALS
SYSTOLIC BLOOD PRESSURE: 140 MMHG | OXYGEN SATURATION: 97 % | BODY MASS INDEX: 23.46 KG/M2 | HEART RATE: 89 BPM | HEIGHT: 71 IN | DIASTOLIC BLOOD PRESSURE: 86 MMHG | WEIGHT: 167.55 LBS

## 2019-12-09 DIAGNOSIS — Z11.3 SCREEN FOR STD (SEXUALLY TRANSMITTED DISEASE): ICD-10-CM

## 2019-12-09 DIAGNOSIS — G62.9 NEUROPATHY: ICD-10-CM

## 2019-12-09 DIAGNOSIS — Z11.3 SCREEN FOR STD (SEXUALLY TRANSMITTED DISEASE): Primary | ICD-10-CM

## 2019-12-09 LAB
ERYTHROCYTE [SEDIMENTATION RATE] IN BLOOD: 9 MM/HR (ref 0–20)
HIV 1+2 AB+HIV1 P24 AG SERPL QL IA: NONREACTIVE
VIT B12 SERPL-MCNC: 820 PG/ML (ref 211–911)

## 2019-12-09 PROCEDURE — 86695 HERPES SIMPLEX TYPE 1 TEST: CPT | Performed by: INTERNAL MEDICINE

## 2019-12-09 PROCEDURE — 87591 N.GONORRHOEAE DNA AMP PROB: CPT | Performed by: INTERNAL MEDICINE

## 2019-12-09 PROCEDURE — 85652 RBC SED RATE AUTOMATED: CPT | Performed by: INTERNAL MEDICINE

## 2019-12-09 PROCEDURE — 36415 COLL VENOUS BLD VENIPUNCTURE: CPT | Performed by: INTERNAL MEDICINE

## 2019-12-09 PROCEDURE — 86696 HERPES SIMPLEX TYPE 2 TEST: CPT | Performed by: INTERNAL MEDICINE

## 2019-12-09 PROCEDURE — 99213 OFFICE O/P EST LOW 20 MIN: CPT | Performed by: INTERNAL MEDICINE

## 2019-12-09 PROCEDURE — 87491 CHLMYD TRACH DNA AMP PROBE: CPT | Performed by: INTERNAL MEDICINE

## 2019-12-09 PROCEDURE — 82607 VITAMIN B-12: CPT | Performed by: INTERNAL MEDICINE

## 2019-12-09 PROCEDURE — 87389 HIV-1 AG W/HIV-1&-2 AB AG IA: CPT | Performed by: INTERNAL MEDICINE

## 2019-12-11 ENCOUNTER — TELEPHONE (OUTPATIENT)
Dept: INTERNAL MEDICINE | Age: 29
End: 2019-12-11

## 2019-12-11 LAB
C TRACH RRNA SPEC QL NAA+PROBE: NEGATIVE
HSV1 GG IGG SER-ACNC: POSITIVE
HSV2 GG IGG SER-ACNC: NEGATIVE
N GONORRHOEA RRNA SPEC QL NAA+PROBE: NEGATIVE
SPECIMEN SOURCE: NORMAL

## 2019-12-12 ENCOUNTER — IMAGING SERVICES (OUTPATIENT)
Dept: MRI IMAGING | Age: 29
End: 2019-12-12

## 2019-12-12 DIAGNOSIS — G62.9 NEUROPATHY: ICD-10-CM

## 2019-12-12 PROCEDURE — 70551 MRI BRAIN STEM W/O DYE: CPT | Performed by: RADIOLOGY

## 2019-12-13 ENCOUNTER — E-ADVICE (OUTPATIENT)
Dept: INTERNAL MEDICINE | Age: 29
End: 2019-12-13

## 2020-02-13 ENCOUNTER — TELEPHONE (OUTPATIENT)
Dept: TELEHEALTH | Age: 30
End: 2020-02-13

## 2020-02-17 ENCOUNTER — OFFICE VISIT (OUTPATIENT)
Dept: DERMATOLOGY | Age: 30
End: 2020-02-17

## 2020-02-17 DIAGNOSIS — L74.510 HYPERHIDROSIS OF AXILLA: Primary | ICD-10-CM

## 2020-02-17 PROCEDURE — 64650 CHEMODENERV ECCRINE GLANDS: CPT | Performed by: DERMATOLOGY

## 2020-02-24 ENCOUNTER — TELEPHONE (OUTPATIENT)
Dept: DERMATOLOGY | Age: 30
End: 2020-02-24

## 2020-04-28 ENCOUNTER — TELEPHONE (OUTPATIENT)
Dept: DERMATOLOGY | Age: 30
End: 2020-04-28

## 2020-05-11 ENCOUNTER — OFFICE VISIT (OUTPATIENT)
Dept: DERMATOLOGY | Age: 30
End: 2020-05-11

## 2020-05-11 DIAGNOSIS — L74.510 HYPERHIDROSIS OF AXILLA: Primary | ICD-10-CM

## 2020-05-11 PROCEDURE — 64650 CHEMODENERV ECCRINE GLANDS: CPT | Performed by: DERMATOLOGY

## 2020-06-05 ENCOUNTER — TELEPHONE (OUTPATIENT)
Dept: SCHEDULING | Age: 30
End: 2020-06-05

## 2020-06-09 ENCOUNTER — TELEPHONE (OUTPATIENT)
Dept: DERMATOLOGY | Age: 30
End: 2020-06-09

## 2020-07-30 ENCOUNTER — TELEPHONE (OUTPATIENT)
Dept: TELEHEALTH | Age: 30
End: 2020-07-30

## 2020-08-10 ENCOUNTER — TELEPHONE (OUTPATIENT)
Dept: DERMATOLOGY | Age: 30
End: 2020-08-10

## 2020-08-10 ENCOUNTER — NURSE TRIAGE (OUTPATIENT)
Dept: TELEHEALTH | Age: 30
End: 2020-08-10

## 2020-08-12 ENCOUNTER — OFFICE VISIT (OUTPATIENT)
Dept: DERMATOLOGY | Age: 30
End: 2020-08-12

## 2020-08-12 DIAGNOSIS — L74.510 HYPERHIDROSIS OF AXILLA: Primary | ICD-10-CM

## 2020-08-12 PROCEDURE — 64650 CHEMODENERV ECCRINE GLANDS: CPT | Performed by: DERMATOLOGY

## 2020-09-23 ENCOUNTER — NURSE TRIAGE (OUTPATIENT)
Dept: INTERNAL MEDICINE | Age: 30
End: 2020-09-23

## 2020-09-24 ENCOUNTER — LAB SERVICES (OUTPATIENT)
Dept: LAB | Age: 30
End: 2020-09-24

## 2020-09-24 ENCOUNTER — OFFICE VISIT (OUTPATIENT)
Dept: INTERNAL MEDICINE | Age: 30
End: 2020-09-24

## 2020-09-24 VITALS
BODY MASS INDEX: 22.22 KG/M2 | WEIGHT: 158.7 LBS | HEIGHT: 71 IN | RESPIRATION RATE: 16 BRPM | SYSTOLIC BLOOD PRESSURE: 120 MMHG | HEART RATE: 57 BPM | TEMPERATURE: 98.3 F | DIASTOLIC BLOOD PRESSURE: 88 MMHG

## 2020-09-24 DIAGNOSIS — R30.0 DYSURIA: ICD-10-CM

## 2020-09-24 DIAGNOSIS — R30.0 DYSURIA: Primary | ICD-10-CM

## 2020-09-24 LAB
APPEARANCE UR: CLEAR
BACTERIA #/AREA URNS HPF: ABNORMAL /HPF
BILIRUB UR QL STRIP: NEGATIVE
COLOR UR: YELLOW
GLUCOSE UR STRIP-MCNC: NEGATIVE MG/DL
HGB UR QL STRIP: ABNORMAL
HYALINE CASTS #/AREA URNS LPF: ABNORMAL /LPF
KETONES UR STRIP-MCNC: NEGATIVE MG/DL
LEUKOCYTE ESTERASE UR QL STRIP: NEGATIVE
NITRITE UR QL STRIP: NEGATIVE
PH UR STRIP: 7 [PH] (ref 5–7)
PROT UR STRIP-MCNC: NEGATIVE MG/DL
RBC #/AREA URNS HPF: ABNORMAL /HPF
SP GR UR STRIP: 1.01 (ref 1–1.03)
SQUAMOUS #/AREA URNS HPF: ABNORMAL /HPF
UROBILINOGEN UR STRIP-MCNC: 0.2 MG/DL
WBC #/AREA URNS HPF: ABNORMAL /HPF

## 2020-09-24 PROCEDURE — 99213 OFFICE O/P EST LOW 20 MIN: CPT | Performed by: INTERNAL MEDICINE

## 2020-09-24 PROCEDURE — 81001 URINALYSIS AUTO W/SCOPE: CPT | Performed by: INTERNAL MEDICINE

## 2020-09-24 PROCEDURE — 87591 N.GONORRHOEAE DNA AMP PROB: CPT | Performed by: INTERNAL MEDICINE

## 2020-09-24 PROCEDURE — 87491 CHLMYD TRACH DNA AMP PROBE: CPT | Performed by: INTERNAL MEDICINE

## 2020-09-24 ASSESSMENT — PATIENT HEALTH QUESTIONNAIRE - PHQ9
CLINICAL INTERPRETATION OF PHQ9 SCORE: NO FURTHER SCREENING NEEDED
1. LITTLE INTEREST OR PLEASURE IN DOING THINGS: NOT AT ALL
SUM OF ALL RESPONSES TO PHQ9 QUESTIONS 1 AND 2: 0
2. FEELING DOWN, DEPRESSED OR HOPELESS: NOT AT ALL
CLINICAL INTERPRETATION OF PHQ2 SCORE: NO FURTHER SCREENING NEEDED
SUM OF ALL RESPONSES TO PHQ9 QUESTIONS 1 AND 2: 0

## 2020-09-25 ENCOUNTER — TELEPHONE (OUTPATIENT)
Dept: INTERNAL MEDICINE | Age: 30
End: 2020-09-25

## 2020-09-25 LAB
C TRACH RRNA UR QL NAA+PROBE: NEGATIVE
Lab: NORMAL
N GONORRHOEA RRNA UR QL NAA+PROBE: NEGATIVE

## 2020-09-30 ENCOUNTER — TELEPHONE (OUTPATIENT)
Dept: INTERNAL MEDICINE | Age: 30
End: 2020-09-30

## 2020-09-30 DIAGNOSIS — N50.812 TESTICULAR PAIN, LEFT: Primary | ICD-10-CM

## 2020-10-01 ENCOUNTER — OFFICE VISIT (OUTPATIENT)
Dept: UROLOGY | Age: 30
End: 2020-10-01
Attending: INTERNAL MEDICINE

## 2020-10-01 VITALS — OXYGEN SATURATION: 99 % | HEART RATE: 67 BPM | DIASTOLIC BLOOD PRESSURE: 86 MMHG | SYSTOLIC BLOOD PRESSURE: 122 MMHG

## 2020-10-01 DIAGNOSIS — N50.812 PAIN IN LEFT TESTICLE: ICD-10-CM

## 2020-10-01 DIAGNOSIS — R10.9 LEFT FLANK PAIN: Primary | ICD-10-CM

## 2020-10-01 PROCEDURE — 99244 OFF/OP CNSLTJ NEW/EST MOD 40: CPT | Performed by: UROLOGY

## 2020-10-08 ENCOUNTER — TELEPHONE (OUTPATIENT)
Dept: UROLOGY | Age: 30
End: 2020-10-08

## 2020-10-08 DIAGNOSIS — R35.0 URINARY FREQUENCY: Primary | ICD-10-CM

## 2020-10-13 ENCOUNTER — HOSPITAL ENCOUNTER (OUTPATIENT)
Dept: CT IMAGING | Age: 30
Discharge: HOME | End: 2020-10-13
Attending: UROLOGY

## 2020-10-13 DIAGNOSIS — R10.9 LEFT FLANK PAIN: ICD-10-CM

## 2020-10-13 PROCEDURE — 74176 CT ABD & PELVIS W/O CONTRAST: CPT | Performed by: RADIOLOGY

## 2020-10-13 PROCEDURE — 74176 CT ABD & PELVIS W/O CONTRAST: CPT

## 2020-10-14 ENCOUNTER — TELEPHONE (OUTPATIENT)
Dept: INTERNAL MEDICINE | Age: 30
End: 2020-10-14

## 2020-10-14 ENCOUNTER — TELEPHONE (OUTPATIENT)
Dept: UROLOGY | Age: 30
End: 2020-10-14

## 2020-10-14 DIAGNOSIS — R10.32 LEFT LOWER QUADRANT ABDOMINAL PAIN: Primary | ICD-10-CM

## 2020-10-15 ENCOUNTER — OFFICE VISIT (OUTPATIENT)
Dept: INTERNAL MEDICINE | Age: 30
End: 2020-10-15

## 2020-10-15 DIAGNOSIS — K59.00 CONSTIPATION, UNSPECIFIED CONSTIPATION TYPE: Primary | ICD-10-CM

## 2020-10-15 RX ORDER — DOCUSATE CALCIUM 240 MG
240 CAPSULE ORAL DAILY
Qty: 30 CAPSULE | Refills: 1 | Status: SHIPPED | OUTPATIENT
Start: 2020-10-15 | End: 2021-01-26

## 2020-10-16 ENCOUNTER — TELEPHONE (OUTPATIENT)
Dept: INTERNAL MEDICINE | Age: 30
End: 2020-10-16

## 2020-10-29 ENCOUNTER — TELEPHONE (OUTPATIENT)
Dept: INTERNAL MEDICINE | Age: 30
End: 2020-10-29

## 2020-11-05 ENCOUNTER — TELEPHONE (OUTPATIENT)
Dept: DERMATOLOGY | Age: 30
End: 2020-11-05

## 2020-11-16 ENCOUNTER — OFFICE VISIT (OUTPATIENT)
Dept: DERMATOLOGY | Age: 30
End: 2020-11-16

## 2020-11-16 DIAGNOSIS — L74.510 HYPERHIDROSIS OF AXILLA: Primary | ICD-10-CM

## 2020-11-16 PROCEDURE — 64650 CHEMODENERV ECCRINE GLANDS: CPT | Performed by: DERMATOLOGY

## 2020-11-16 RX ORDER — ONABOTULINUMTOXINA 100 [USP'U]/1
INJECTION, POWDER, LYOPHILIZED, FOR SOLUTION INTRADERMAL; INTRAMUSCULAR
Qty: 1 EACH | Refills: 3 | Status: SHIPPED | OUTPATIENT
Start: 2020-11-16 | End: 2021-04-26 | Stop reason: SDUPTHER

## 2020-11-16 RX ORDER — AMITRIPTYLINE HYDROCHLORIDE 10 MG/1
TABLET, FILM COATED ORAL
COMMUNITY
Start: 2020-11-03 | End: 2021-11-09 | Stop reason: ALTCHOICE

## 2020-11-16 RX ORDER — OMEPRAZOLE 20 MG/1
CAPSULE, DELAYED RELEASE ORAL
COMMUNITY
Start: 2020-10-23 | End: 2021-07-01 | Stop reason: ALTCHOICE

## 2020-11-20 ENCOUNTER — TELEPHONE (OUTPATIENT)
Dept: INTERNAL MEDICINE | Age: 30
End: 2020-11-20

## 2021-01-01 ENCOUNTER — EXTERNAL RECORD (OUTPATIENT)
Dept: OTHER | Age: 31
End: 2021-01-01

## 2021-01-13 ENCOUNTER — E-ADVICE (OUTPATIENT)
Dept: INTERNAL MEDICINE | Age: 31
End: 2021-01-13

## 2021-01-13 DIAGNOSIS — R10.9 ABDOMINAL PAIN, UNSPECIFIED ABDOMINAL LOCATION: Primary | ICD-10-CM

## 2021-01-14 ENCOUNTER — TELEPHONE (OUTPATIENT)
Dept: ADMISSION | Age: 31
End: 2021-01-14

## 2021-01-25 ENCOUNTER — TELEPHONE (OUTPATIENT)
Dept: GASTROENTEROLOGY | Age: 31
End: 2021-01-25

## 2021-01-25 ENCOUNTER — OFFICE VISIT (OUTPATIENT)
Dept: GASTROENTEROLOGY | Age: 31
End: 2021-01-25
Attending: INTERNAL MEDICINE

## 2021-01-25 VITALS — WEIGHT: 167 LBS | BODY MASS INDEX: 23.29 KG/M2

## 2021-01-25 DIAGNOSIS — Z01.812 PRE-PROCEDURAL LABORATORY EXAMINATION: Primary | ICD-10-CM

## 2021-01-25 DIAGNOSIS — R10.9 ABDOMINAL PAIN, UNSPECIFIED ABDOMINAL LOCATION: Primary | ICD-10-CM

## 2021-01-25 PROCEDURE — 99244 OFF/OP CNSLTJ NEW/EST MOD 40: CPT | Performed by: INTERNAL MEDICINE

## 2021-01-25 RX ORDER — DOCUSATE CALCIUM 240 MG
240 CAPSULE ORAL DAILY
COMMUNITY
End: 2021-07-01 | Stop reason: SDUPTHER

## 2021-01-26 RX ORDER — DOCUSATE CALCIUM 240 MG
CAPSULE ORAL
Qty: 30 CAPSULE | Refills: 5 | Status: SHIPPED | OUTPATIENT
Start: 2021-01-26 | End: 2021-07-01 | Stop reason: SDUPTHER

## 2021-02-08 ENCOUNTER — TELEPHONE (OUTPATIENT)
Dept: DERMATOLOGY | Age: 31
End: 2021-02-08

## 2021-02-10 ENCOUNTER — OFFICE VISIT (OUTPATIENT)
Dept: DERMATOLOGY | Age: 31
End: 2021-02-10

## 2021-02-10 ENCOUNTER — APPOINTMENT (OUTPATIENT)
Dept: DERMATOLOGY | Age: 31
End: 2021-02-10

## 2021-02-10 DIAGNOSIS — D23.9 INTRADERMAL NEVUS: ICD-10-CM

## 2021-02-10 DIAGNOSIS — L74.510 HYPERHIDROSIS OF AXILLA: Primary | ICD-10-CM

## 2021-02-10 PROCEDURE — 64650 CHEMODENERV ECCRINE GLANDS: CPT | Performed by: DERMATOLOGY

## 2021-03-08 ENCOUNTER — OFFICE VISIT (OUTPATIENT)
Dept: DERMATOLOGY | Age: 31
End: 2021-03-08

## 2021-03-08 DIAGNOSIS — L73.0 ACNE KELOIDALIS NUCHAE: Primary | ICD-10-CM

## 2021-03-08 PROCEDURE — 11900 INJECT SKIN LESIONS </W 7: CPT | Performed by: DERMATOLOGY

## 2021-03-08 RX ORDER — CLINDAMYCIN PHOSPHATE 11.9 MG/ML
SOLUTION TOPICAL
Qty: 30 ML | Refills: 3 | Status: SHIPPED | OUTPATIENT
Start: 2021-03-08 | End: 2021-12-01 | Stop reason: ALTCHOICE

## 2021-03-08 RX ORDER — CLOBETASOL PROPIONATE 0.05 MG/G
GEL TOPICAL
Qty: 30 G | Refills: 3 | Status: SHIPPED | OUTPATIENT
Start: 2021-03-08 | End: 2021-12-01 | Stop reason: ALTCHOICE

## 2021-03-19 ASSESSMENT — ACTIVITIES OF DAILY LIVING (ADL)
ADL_BEFORE_ADMISSION: INDEPENDENT
ADL_SCORE: 12

## 2021-03-23 ENCOUNTER — LAB SERVICES (OUTPATIENT)
Dept: LAB | Age: 31
End: 2021-03-23

## 2021-03-23 DIAGNOSIS — Z01.812 PRE-PROCEDURAL LABORATORY EXAMINATION: ICD-10-CM

## 2021-03-23 PROCEDURE — U0003 INFECTIOUS AGENT DETECTION BY NUCLEIC ACID (DNA OR RNA); SEVERE ACUTE RESPIRATORY SYNDROME CORONAVIRUS 2 (SARS-COV-2) (CORONAVIRUS DISEASE [COVID-19]), AMPLIFIED PROBE TECHNIQUE, MAKING USE OF HIGH THROUGHPUT TECHNOLOGIES AS DESCRIBED BY CMS-2020-01-R: HCPCS | Performed by: INTERNAL MEDICINE

## 2021-03-23 PROCEDURE — U0005 INFEC AGEN DETEC AMPLI PROBE: HCPCS | Performed by: INTERNAL MEDICINE

## 2021-03-24 ENCOUNTER — TELEPHONE (OUTPATIENT)
Dept: GASTROENTEROLOGY | Age: 31
End: 2021-03-24

## 2021-03-24 LAB
SARS-COV-2 RNA RESP QL NAA+PROBE: NOT DETECTED
SERVICE CMNT-IMP: NORMAL
SERVICE CMNT-IMP: NORMAL

## 2021-03-25 ENCOUNTER — ANESTHESIA EVENT (OUTPATIENT)
Dept: SURGERY | Age: 31
End: 2021-03-25

## 2021-03-26 ENCOUNTER — ANESTHESIA (OUTPATIENT)
Dept: SURGERY | Age: 31
End: 2021-03-26

## 2021-03-26 ENCOUNTER — NURSE TRIAGE (OUTPATIENT)
Dept: TELEHEALTH | Age: 31
End: 2021-03-26

## 2021-03-26 ENCOUNTER — HOSPITAL ENCOUNTER (OUTPATIENT)
Age: 31
Discharge: HOME | End: 2021-03-26
Attending: INTERNAL MEDICINE | Admitting: INTERNAL MEDICINE

## 2021-03-26 VITALS
TEMPERATURE: 97.5 F | OXYGEN SATURATION: 96 % | SYSTOLIC BLOOD PRESSURE: 117 MMHG | BODY MASS INDEX: 23.52 KG/M2 | DIASTOLIC BLOOD PRESSURE: 79 MMHG | HEIGHT: 71 IN | HEART RATE: 74 BPM | WEIGHT: 168 LBS | RESPIRATION RATE: 18 BRPM

## 2021-03-26 DIAGNOSIS — R10.9 ABDOMINAL PAIN: ICD-10-CM

## 2021-03-26 PROCEDURE — A45380 ANES COLONOSCOPY FLEX-PROX SPLEN FLEX; W: Performed by: ANESTHESIOLOGIST ASSISTANT

## 2021-03-26 PROCEDURE — 10002800 HB RX 250 W HCPCS: Performed by: ANESTHESIOLOGIST ASSISTANT

## 2021-03-26 PROCEDURE — 10003436 HB UPPER GI ENDOSCOPY: Performed by: INTERNAL MEDICINE

## 2021-03-26 PROCEDURE — 45380 COLONOSCOPY AND BIOPSY: CPT | Performed by: INTERNAL MEDICINE

## 2021-03-26 PROCEDURE — 10001568 HB ANESTH MAC IV ADD'L 1/2 HR: Performed by: INTERNAL MEDICINE

## 2021-03-26 PROCEDURE — 10004641 HB COUNTER-GI

## 2021-03-26 PROCEDURE — 10002801 HB RX 250 W/O HCPCS: Performed by: ANESTHESIOLOGIST ASSISTANT

## 2021-03-26 PROCEDURE — 88342 IMHCHEM/IMCYTCHM 1ST ANTB: CPT | Performed by: INTERNAL MEDICINE

## 2021-03-26 PROCEDURE — 10003428 HB COLONOSCOPY: Performed by: INTERNAL MEDICINE

## 2021-03-26 PROCEDURE — 10002807 HB RX 258: Performed by: ANESTHESIOLOGIST ASSISTANT

## 2021-03-26 PROCEDURE — 43239 EGD BIOPSY SINGLE/MULTIPLE: CPT | Performed by: INTERNAL MEDICINE

## 2021-03-26 PROCEDURE — 10002362 HB ANESTH MAC IV 1ST 1/2 HR: Performed by: INTERNAL MEDICINE

## 2021-03-26 PROCEDURE — A45380 ANES COLONOSCOPY FLEX-PROX SPLEN FLEX; W: Performed by: ANESTHESIOLOGY

## 2021-03-26 RX ORDER — SODIUM CHLORIDE 9 MG/ML
INJECTION, SOLUTION INTRAVENOUS CONTINUOUS
Status: DISCONTINUED | OUTPATIENT
Start: 2021-03-26 | End: 2021-03-26 | Stop reason: HOSPADM

## 2021-03-26 RX ORDER — DEXTROSE MONOHYDRATE 50 MG/ML
INJECTION, SOLUTION INTRAVENOUS CONTINUOUS PRN
Status: DISCONTINUED | OUTPATIENT
Start: 2021-03-26 | End: 2021-03-26 | Stop reason: HOSPADM

## 2021-03-26 RX ORDER — DEXTROSE MONOHYDRATE 25 G/50ML
25 INJECTION, SOLUTION INTRAVENOUS PRN
Status: DISCONTINUED | OUTPATIENT
Start: 2021-03-26 | End: 2021-03-26 | Stop reason: HOSPADM

## 2021-03-26 RX ORDER — SODIUM CHLORIDE, SODIUM LACTATE, POTASSIUM CHLORIDE, CALCIUM CHLORIDE 600; 310; 30; 20 MG/100ML; MG/100ML; MG/100ML; MG/100ML
INJECTION, SOLUTION INTRAVENOUS CONTINUOUS
Status: DISCONTINUED | OUTPATIENT
Start: 2021-03-26 | End: 2021-03-26 | Stop reason: HOSPADM

## 2021-03-26 RX ORDER — GLYCOPYRROLATE 0.2 MG/ML
INJECTION, SOLUTION INTRAMUSCULAR; INTRAVENOUS PRN
Status: DISCONTINUED | OUTPATIENT
Start: 2021-03-26 | End: 2021-03-26

## 2021-03-26 RX ORDER — PHENYLEPHRINE HYDROCHLORIDE 10 MG/ML
INJECTION, SOLUTION INTRAMUSCULAR; INTRAVENOUS; SUBCUTANEOUS PRN
Status: DISCONTINUED | OUTPATIENT
Start: 2021-03-26 | End: 2021-03-26

## 2021-03-26 RX ORDER — HUMAN INSULIN 100 [IU]/ML
INJECTION, SOLUTION SUBCUTANEOUS
Status: DISCONTINUED | OUTPATIENT
Start: 2021-03-26 | End: 2021-03-26 | Stop reason: HOSPADM

## 2021-03-26 RX ORDER — PROCHLORPERAZINE EDISYLATE 5 MG/ML
5 INJECTION INTRAMUSCULAR; INTRAVENOUS EVERY 4 HOURS PRN
Status: DISCONTINUED | OUTPATIENT
Start: 2021-03-26 | End: 2021-03-26 | Stop reason: HOSPADM

## 2021-03-26 RX ORDER — NICOTINE POLACRILEX 4 MG
30 LOZENGE BUCCAL
Status: DISCONTINUED | OUTPATIENT
Start: 2021-03-26 | End: 2021-03-26 | Stop reason: HOSPADM

## 2021-03-26 RX ORDER — SODIUM CHLORIDE 9 MG/ML
INJECTION, SOLUTION INTRAVENOUS CONTINUOUS PRN
Status: DISCONTINUED | OUTPATIENT
Start: 2021-03-26 | End: 2021-03-26

## 2021-03-26 RX ORDER — PROPOFOL 10 MG/ML
INJECTION, EMULSION INTRAVENOUS PRN
Status: DISCONTINUED | OUTPATIENT
Start: 2021-03-26 | End: 2021-03-26

## 2021-03-26 RX ORDER — 0.9 % SODIUM CHLORIDE 0.9 %
2 VIAL (ML) INJECTION EVERY 12 HOURS SCHEDULED
Status: DISCONTINUED | OUTPATIENT
Start: 2021-03-26 | End: 2021-03-26 | Stop reason: HOSPADM

## 2021-03-26 RX ORDER — DIPHENHYDRAMINE HYDROCHLORIDE 50 MG/ML
25 INJECTION INTRAMUSCULAR; INTRAVENOUS EVERY 4 HOURS PRN
Status: DISCONTINUED | OUTPATIENT
Start: 2021-03-26 | End: 2021-03-26 | Stop reason: HOSPADM

## 2021-03-26 RX ORDER — LIDOCAINE HYDROCHLORIDE 20 MG/ML
10 SOLUTION OROPHARYNGEAL
Status: DISCONTINUED | OUTPATIENT
Start: 2021-03-26 | End: 2021-03-26 | Stop reason: HOSPADM

## 2021-03-26 RX ORDER — ALBUTEROL SULFATE 2.5 MG/3ML
5 SOLUTION RESPIRATORY (INHALATION) ONCE
Status: DISCONTINUED | OUTPATIENT
Start: 2021-03-26 | End: 2021-03-26 | Stop reason: HOSPADM

## 2021-03-26 RX ORDER — ONDANSETRON 2 MG/ML
4 INJECTION INTRAMUSCULAR; INTRAVENOUS 2 TIMES DAILY PRN
Status: DISCONTINUED | OUTPATIENT
Start: 2021-03-26 | End: 2021-03-26 | Stop reason: HOSPADM

## 2021-03-26 RX ORDER — HYDRALAZINE HYDROCHLORIDE 20 MG/ML
5 INJECTION INTRAMUSCULAR; INTRAVENOUS EVERY 10 MIN PRN
Status: DISCONTINUED | OUTPATIENT
Start: 2021-03-26 | End: 2021-03-26 | Stop reason: HOSPADM

## 2021-03-26 RX ORDER — METOCLOPRAMIDE HYDROCHLORIDE 5 MG/ML
5 INJECTION INTRAMUSCULAR; INTRAVENOUS EVERY 6 HOURS PRN
Status: DISCONTINUED | OUTPATIENT
Start: 2021-03-26 | End: 2021-03-26 | Stop reason: HOSPADM

## 2021-03-26 RX ADMIN — GLYCOPYRROLATE 0.2 MG: 0.2 INJECTION, SOLUTION INTRAMUSCULAR; INTRAVENOUS at 09:55

## 2021-03-26 RX ADMIN — PHENYLEPHRINE HYDROCHLORIDE 100 MCG: 10 INJECTION INTRAVENOUS at 10:04

## 2021-03-26 RX ADMIN — PROPOFOL 130 MCG/KG/MIN: 10 INJECTION, EMULSION INTRAVENOUS at 09:34

## 2021-03-26 RX ADMIN — PROPOFOL 100 MG: 10 INJECTION, EMULSION INTRAVENOUS at 09:34

## 2021-03-26 RX ADMIN — SODIUM CHLORIDE: 0.9 INJECTION, SOLUTION INTRAVENOUS at 09:27

## 2021-03-26 RX ADMIN — PHENYLEPHRINE HYDROCHLORIDE 100 MCG: 10 INJECTION INTRAVENOUS at 09:46

## 2021-03-26 RX ADMIN — PROPOFOL 60 MG: 10 INJECTION, EMULSION INTRAVENOUS at 09:39

## 2021-03-26 RX ADMIN — PHENYLEPHRINE HYDROCHLORIDE 100 MCG: 10 INJECTION INTRAVENOUS at 09:55

## 2021-03-26 ASSESSMENT — PAIN SCALES - GENERAL
PAINLEVEL_OUTOF10: 0
PAINLEVEL_OUTOF10: 5

## 2021-03-26 ASSESSMENT — ACTIVITIES OF DAILY LIVING (ADL)
NEEDS_ASSIST: NO
HISTORY OF FALLING IN THE LAST YEAR (PRIOR TO ADMISSION): NO
ADL_SHORT_OF_BREATH: NO
ADL_SCORE: 12
ADL_BEFORE_ADMISSION: INDEPENDENT
RECENT_DECLINE_ADL: NO

## 2021-03-31 LAB
ASR DISCLAIMER: NORMAL
CASE RPRT: NORMAL
CLINICAL INFO: NORMAL
PATH REPORT.FINAL DX SPEC: NORMAL
PATH REPORT.GROSS SPEC: NORMAL
PATH REPORT.MICROSCOPIC SPEC OTHER STN: NORMAL

## 2021-04-19 ENCOUNTER — OFFICE VISIT (OUTPATIENT)
Dept: DERMATOLOGY | Age: 31
End: 2021-04-19

## 2021-04-19 DIAGNOSIS — L73.0 ACNE KELOIDALIS NUCHAE: Primary | ICD-10-CM

## 2021-04-19 PROCEDURE — 11900 INJECT SKIN LESIONS </W 7: CPT | Performed by: DERMATOLOGY

## 2021-04-20 ENCOUNTER — HOSPITAL ENCOUNTER (OUTPATIENT)
Dept: MRI IMAGING | Age: 31
Discharge: HOME | End: 2021-04-20
Attending: INTERNAL MEDICINE

## 2021-04-20 DIAGNOSIS — R10.9 ABDOMINAL PAIN, UNSPECIFIED ABDOMINAL LOCATION: ICD-10-CM

## 2021-04-20 PROCEDURE — A9585 GADOBUTROL INJECTION: HCPCS | Performed by: INTERNAL MEDICINE

## 2021-04-20 PROCEDURE — 74183 MRI ABD W/O CNTR FLWD CNTR: CPT | Performed by: RADIOLOGY

## 2021-04-20 PROCEDURE — 10002800 HB RX 250 W HCPCS: Performed by: STUDENT IN AN ORGANIZED HEALTH CARE EDUCATION/TRAINING PROGRAM

## 2021-04-20 PROCEDURE — 72197 MRI PELVIS W/O & W/DYE: CPT | Performed by: RADIOLOGY

## 2021-04-20 PROCEDURE — 10002805 HB CONTRAST AGENT: Performed by: INTERNAL MEDICINE

## 2021-04-20 PROCEDURE — 74183 MRI ABD W/O CNTR FLWD CNTR: CPT

## 2021-04-20 RX ORDER — 0.9 % SODIUM CHLORIDE 0.9 %
20 VIAL (ML) INJECTION ONCE
Status: DISCONTINUED | OUTPATIENT
Start: 2021-04-20 | End: 2021-04-21 | Stop reason: HOSPADM

## 2021-04-20 RX ORDER — GADOBUTROL 604.72 MG/ML
7.5 INJECTION INTRAVENOUS ONCE
Status: COMPLETED | OUTPATIENT
Start: 2021-04-20 | End: 2021-04-20

## 2021-04-20 RX ADMIN — GLUCAGON HYDROCHLORIDE 1 MG: KIT at 15:30

## 2021-04-20 RX ADMIN — GADOBUTROL 7.5 ML: 604.72 INJECTION INTRAVENOUS at 16:07

## 2021-04-22 ENCOUNTER — TELEPHONE (OUTPATIENT)
Dept: GASTROENTEROLOGY | Age: 31
End: 2021-04-22

## 2021-04-26 ENCOUNTER — TELEPHONE (OUTPATIENT)
Dept: DERMATOLOGY | Age: 31
End: 2021-04-26

## 2021-04-26 DIAGNOSIS — L74.510 HYPERHIDROSIS OF AXILLA: ICD-10-CM

## 2021-04-26 RX ORDER — ONABOTULINUMTOXINA 100 [USP'U]/1
INJECTION, POWDER, LYOPHILIZED, FOR SOLUTION INTRADERMAL; INTRAMUSCULAR
Qty: 1 EACH | Refills: 2 | Status: SHIPPED | OUTPATIENT
Start: 2021-04-26 | End: 2022-01-13 | Stop reason: SDUPTHER

## 2021-05-03 ENCOUNTER — OFFICE VISIT (OUTPATIENT)
Dept: DERMATOLOGY | Age: 31
End: 2021-05-03

## 2021-05-03 DIAGNOSIS — L74.510 HYPERHIDROSIS OF AXILLA: Primary | ICD-10-CM

## 2021-05-03 PROCEDURE — 64650 CHEMODENERV ECCRINE GLANDS: CPT | Performed by: DERMATOLOGY

## 2021-05-05 ENCOUNTER — TELEPHONE (OUTPATIENT)
Dept: DERMATOLOGY | Age: 31
End: 2021-05-05

## 2021-05-05 DIAGNOSIS — L74.510 HYPERHIDROSIS OF AXILLA: Primary | ICD-10-CM

## 2021-06-01 RX ORDER — PANTOPRAZOLE SODIUM 40 MG/1
TABLET, DELAYED RELEASE ORAL
Qty: 30 TABLET | Refills: 1 | Status: SHIPPED | OUTPATIENT
Start: 2021-06-01 | End: 2021-07-01 | Stop reason: ALTCHOICE

## 2021-06-30 ASSESSMENT — PATIENT HEALTH QUESTIONNAIRE - PHQ9
SUM OF ALL RESPONSES TO PHQ9 QUESTIONS 1 AND 2: 0
CLINICAL INTERPRETATION OF PHQ2 SCORE: NO FURTHER SCREENING NEEDED
CLINICAL INTERPRETATION OF PHQ9 SCORE: NO FURTHER SCREENING NEEDED
1. LITTLE INTEREST OR PLEASURE IN DOING THINGS: NOT AT ALL
2. FEELING DOWN, DEPRESSED OR HOPELESS: NOT AT ALL
SUM OF ALL RESPONSES TO PHQ9 QUESTIONS 1 AND 2: 0

## 2021-07-01 ENCOUNTER — OFFICE VISIT (OUTPATIENT)
Dept: INTERNAL MEDICINE | Age: 31
End: 2021-07-01

## 2021-07-01 ENCOUNTER — LAB SERVICES (OUTPATIENT)
Dept: LAB | Age: 31
End: 2021-07-01

## 2021-07-01 VITALS
HEIGHT: 71 IN | RESPIRATION RATE: 18 BRPM | WEIGHT: 165.4 LBS | SYSTOLIC BLOOD PRESSURE: 122 MMHG | TEMPERATURE: 98.1 F | HEART RATE: 63 BPM | BODY MASS INDEX: 23.15 KG/M2 | DIASTOLIC BLOOD PRESSURE: 80 MMHG

## 2021-07-01 DIAGNOSIS — Z11.3 SCREENING FOR STDS (SEXUALLY TRANSMITTED DISEASES): Primary | ICD-10-CM

## 2021-07-01 DIAGNOSIS — K58.1 IRRITABLE BOWEL SYNDROME WITH CONSTIPATION: ICD-10-CM

## 2021-07-01 DIAGNOSIS — Z11.3 SCREENING FOR STDS (SEXUALLY TRANSMITTED DISEASES): ICD-10-CM

## 2021-07-01 LAB — HIV 1+2 AB+HIV1 P24 AG SERPL QL IA: NONREACTIVE

## 2021-07-01 PROCEDURE — 87491 CHLMYD TRACH DNA AMP PROBE: CPT | Performed by: INTERNAL MEDICINE

## 2021-07-01 PROCEDURE — 87389 HIV-1 AG W/HIV-1&-2 AB AG IA: CPT | Performed by: INTERNAL MEDICINE

## 2021-07-01 PROCEDURE — 87661 TRICHOMONAS VAGINALIS AMPLIF: CPT | Performed by: INTERNAL MEDICINE

## 2021-07-01 PROCEDURE — 36415 COLL VENOUS BLD VENIPUNCTURE: CPT | Performed by: INTERNAL MEDICINE

## 2021-07-01 PROCEDURE — 87591 N.GONORRHOEAE DNA AMP PROB: CPT | Performed by: INTERNAL MEDICINE

## 2021-07-01 PROCEDURE — 99213 OFFICE O/P EST LOW 20 MIN: CPT | Performed by: INTERNAL MEDICINE

## 2021-07-01 RX ORDER — DOCUSATE CALCIUM 240 MG
240 CAPSULE ORAL DAILY
Qty: 30 CAPSULE | Refills: 5 | Status: SHIPPED | OUTPATIENT
Start: 2021-07-01 | End: 2021-11-09 | Stop reason: ALTCHOICE

## 2021-07-02 LAB
C TRACH RRNA UR QL NAA+PROBE: NEGATIVE
Lab: NORMAL
N GONORRHOEA RRNA UR QL NAA+PROBE: NEGATIVE
T VAGINALIS RRNA UR QL NAA+PROBE: NEGATIVE

## 2021-08-04 ENCOUNTER — OFFICE VISIT (OUTPATIENT)
Dept: DERMATOLOGY | Age: 31
End: 2021-08-04

## 2021-08-04 DIAGNOSIS — L91.8 INFLAMED SKIN TAG: Primary | ICD-10-CM

## 2021-08-04 DIAGNOSIS — L73.0 ACNE KELOIDALIS NUCHAE: ICD-10-CM

## 2021-08-04 PROCEDURE — 11900 INJECT SKIN LESIONS </W 7: CPT | Performed by: DERMATOLOGY

## 2021-08-04 PROCEDURE — 11200 RMVL SKIN TAGS UP TO&INC 15: CPT | Performed by: DERMATOLOGY

## 2021-08-04 RX ORDER — TRIAMCINOLONE ACETONIDE 40 MG/ML
2 INJECTION, SUSPENSION INTRA-ARTICULAR; INTRAMUSCULAR ONCE
Status: COMPLETED | OUTPATIENT
Start: 2021-08-04 | End: 2021-08-04

## 2021-08-04 RX ADMIN — TRIAMCINOLONE ACETONIDE 2 MG: 40 INJECTION, SUSPENSION INTRA-ARTICULAR; INTRAMUSCULAR at 13:32

## 2021-08-09 ENCOUNTER — OFFICE VISIT (OUTPATIENT)
Dept: DERMATOLOGY | Age: 31
End: 2021-08-09

## 2021-08-09 DIAGNOSIS — L74.510 HYPERHIDROSIS OF AXILLA: Primary | ICD-10-CM

## 2021-08-09 PROCEDURE — 64650 CHEMODENERV ECCRINE GLANDS: CPT | Performed by: DERMATOLOGY

## 2021-09-03 ENCOUNTER — TELEPHONE (OUTPATIENT)
Dept: INTERNAL MEDICINE | Age: 31
End: 2021-09-03

## 2021-09-07 ENCOUNTER — OFFICE VISIT (OUTPATIENT)
Dept: INTERNAL MEDICINE | Age: 31
End: 2021-09-07

## 2021-09-07 ENCOUNTER — APPOINTMENT (OUTPATIENT)
Dept: LAB | Age: 31
End: 2021-09-07

## 2021-09-07 VITALS
DIASTOLIC BLOOD PRESSURE: 80 MMHG | SYSTOLIC BLOOD PRESSURE: 120 MMHG | HEART RATE: 75 BPM | TEMPERATURE: 98.7 F | WEIGHT: 167.9 LBS | RESPIRATION RATE: 18 BRPM | BODY MASS INDEX: 23.51 KG/M2 | HEIGHT: 71 IN

## 2021-09-07 DIAGNOSIS — K58.1 IRRITABLE BOWEL SYNDROME WITH CONSTIPATION: Primary | ICD-10-CM

## 2021-09-07 LAB
T4 FREE SERPL-MCNC: 0.8 NG/DL (ref 0.8–1.5)
TSH SERPL-ACNC: 0.73 MCUNITS/ML (ref 0.35–5)

## 2021-09-07 PROCEDURE — 84439 ASSAY OF FREE THYROXINE: CPT | Performed by: INTERNAL MEDICINE

## 2021-09-07 PROCEDURE — 84443 ASSAY THYROID STIM HORMONE: CPT | Performed by: INTERNAL MEDICINE

## 2021-09-07 PROCEDURE — 99213 OFFICE O/P EST LOW 20 MIN: CPT | Performed by: INTERNAL MEDICINE

## 2021-09-07 PROCEDURE — 36415 COLL VENOUS BLD VENIPUNCTURE: CPT | Performed by: INTERNAL MEDICINE

## 2021-09-07 RX ORDER — PLECANATIDE 3 MG/1
3 TABLET ORAL DAILY
COMMUNITY
Start: 2021-08-16 | End: 2021-12-01 | Stop reason: ALTCHOICE

## 2021-09-08 ENCOUNTER — TELEPHONE (OUTPATIENT)
Dept: INTERNAL MEDICINE | Age: 31
End: 2021-09-08

## 2021-09-24 ENCOUNTER — TELEPHONE (OUTPATIENT)
Dept: INTERNAL MEDICINE | Age: 31
End: 2021-09-24

## 2021-09-24 DIAGNOSIS — R61 HYPERHIDROSIS: Primary | ICD-10-CM

## 2021-09-29 ENCOUNTER — TELEPHONE (OUTPATIENT)
Dept: INTERNAL MEDICINE | Age: 31
End: 2021-09-29

## 2021-10-25 ENCOUNTER — OFFICE VISIT (OUTPATIENT)
Dept: DERMATOLOGY | Age: 31
End: 2021-10-25

## 2021-10-25 DIAGNOSIS — L74.510 HYPERHIDROSIS OF AXILLA: Primary | ICD-10-CM

## 2021-10-25 DIAGNOSIS — L73.0 ACNE KELOIDALIS NUCHAE: ICD-10-CM

## 2021-10-25 PROCEDURE — 99214 OFFICE O/P EST MOD 30 MIN: CPT | Performed by: DERMATOLOGY

## 2021-11-08 ENCOUNTER — E-ADVICE (OUTPATIENT)
Dept: DERMATOLOGY | Age: 31
End: 2021-11-08

## 2021-11-09 ENCOUNTER — LAB SERVICES (OUTPATIENT)
Dept: LAB | Age: 31
End: 2021-11-09

## 2021-11-09 ENCOUNTER — E-ADVICE (OUTPATIENT)
Dept: INTEGRATIVE MEDICINE | Age: 31
End: 2021-11-09

## 2021-11-09 ENCOUNTER — OFFICE VISIT (OUTPATIENT)
Dept: INTEGRATIVE MEDICINE | Age: 31
End: 2021-11-09

## 2021-11-09 VITALS
RESPIRATION RATE: 18 BRPM | SYSTOLIC BLOOD PRESSURE: 124 MMHG | OXYGEN SATURATION: 98 % | WEIGHT: 169.2 LBS | HEIGHT: 70 IN | BODY MASS INDEX: 24.22 KG/M2 | HEART RATE: 86 BPM | DIASTOLIC BLOOD PRESSURE: 84 MMHG

## 2021-11-09 DIAGNOSIS — R61 HYPERHIDROSIS: ICD-10-CM

## 2021-11-09 DIAGNOSIS — Z23 NEED FOR VACCINATION: Primary | ICD-10-CM

## 2021-11-09 DIAGNOSIS — K58.1 IRRITABLE BOWEL SYNDROME WITH CONSTIPATION: Primary | ICD-10-CM

## 2021-11-09 LAB
ALBUMIN SERPL-MCNC: 4.4 G/DL (ref 3.6–5.1)
ALBUMIN/GLOB SERPL: 1.1 {RATIO} (ref 1–2.4)
ALP SERPL-CCNC: 60 UNITS/L (ref 45–117)
ALT SERPL-CCNC: 27 UNITS/L
ANION GAP SERPL CALC-SCNC: 11 MMOL/L (ref 10–20)
AST SERPL-CCNC: 21 UNITS/L
BILIRUB SERPL-MCNC: 0.7 MG/DL (ref 0.2–1)
BUN SERPL-MCNC: 12 MG/DL (ref 6–20)
BUN/CREAT SERPL: 13 (ref 7–25)
CALCIUM SERPL-MCNC: 9.6 MG/DL (ref 8.4–10.2)
CHLORIDE SERPL-SCNC: 105 MMOL/L (ref 98–107)
CO2 SERPL-SCNC: 26 MMOL/L (ref 21–32)
CREAT SERPL-MCNC: 0.94 MG/DL (ref 0.67–1.17)
FASTING DURATION TIME PATIENT: ABNORMAL H
GFR SERPLBLD BASED ON 1.73 SQ M-ARVRAT: >90 ML/MIN
GLOBULIN SER-MCNC: 3.9 G/DL (ref 2–4)
GLUCOSE SERPL-MCNC: 97 MG/DL (ref 70–99)
HBA1C MFR BLD: 5.9 % (ref 4.5–5.6)
POTASSIUM SERPL-SCNC: 3.8 MMOL/L (ref 3.4–5.1)
PROT SERPL-MCNC: 8.3 G/DL (ref 6.4–8.2)
SODIUM SERPL-SCNC: 138 MMOL/L (ref 135–145)

## 2021-11-09 PROCEDURE — 83036 HEMOGLOBIN GLYCOSYLATED A1C: CPT | Performed by: INTERNAL MEDICINE

## 2021-11-09 PROCEDURE — 99215 OFFICE O/P EST HI 40 MIN: CPT | Performed by: FAMILY MEDICINE

## 2021-11-09 PROCEDURE — 80053 COMPREHEN METABOLIC PANEL: CPT | Performed by: INTERNAL MEDICINE

## 2021-11-09 PROCEDURE — 99417 PROLNG OP E/M EACH 15 MIN: CPT | Performed by: FAMILY MEDICINE

## 2021-11-09 PROCEDURE — 36415 COLL VENOUS BLD VENIPUNCTURE: CPT | Performed by: INTERNAL MEDICINE

## 2021-11-09 RX ORDER — ALUMINUM CHLORIDE 20 %
SOLUTION, NON-ORAL TOPICAL
COMMUNITY
Start: 2021-10-04 | End: 2021-12-01 | Stop reason: ALTCHOICE

## 2021-11-09 RX ORDER — DOCUSATE SODIUM -SENNOSIDES 50; 8.6 MG/1; MG/1
TABLET, COATED ORAL
COMMUNITY
Start: 2021-10-01 | End: 2021-12-01 | Stop reason: ALTCHOICE

## 2021-11-09 RX ORDER — GLYCOPYRROLATE 1 MG/1
TABLET ORAL
COMMUNITY
Start: 2021-11-08 | End: 2021-12-01 | Stop reason: ALTCHOICE

## 2021-11-09 RX ORDER — AMOXICILLIN 250 MG
1 CAPSULE ORAL NIGHTLY
COMMUNITY
Start: 2021-09-30 | End: 2021-12-29

## 2021-11-09 ASSESSMENT — PATIENT HEALTH QUESTIONNAIRE - PHQ9
SUM OF ALL RESPONSES TO PHQ9 QUESTIONS 1 AND 2: 0
SUM OF ALL RESPONSES TO PHQ9 QUESTIONS 1 AND 2: 0
1. LITTLE INTEREST OR PLEASURE IN DOING THINGS: NOT AT ALL
CLINICAL INTERPRETATION OF PHQ2 SCORE: NO FURTHER SCREENING NEEDED
CLINICAL INTERPRETATION OF PHQ9 SCORE: NO FURTHER SCREENING NEEDED
SUM OF ALL RESPONSES TO PHQ9 QUESTIONS 1 AND 2: 0
2. FEELING DOWN, DEPRESSED OR HOPELESS: NOT AT ALL

## 2021-11-10 ENCOUNTER — OFFICE VISIT (OUTPATIENT)
Dept: DERMATOLOGY | Age: 31
End: 2021-11-10

## 2021-11-10 ENCOUNTER — TELEPHONE (OUTPATIENT)
Dept: DERMATOLOGY | Age: 31
End: 2021-11-10

## 2021-11-10 DIAGNOSIS — L74.510 HYPERHIDROSIS OF AXILLA: Primary | ICD-10-CM

## 2021-11-10 DIAGNOSIS — L28.1 PRURIGO NODULARIS: ICD-10-CM

## 2021-11-10 PROCEDURE — 64650 CHEMODENERV ECCRINE GLANDS: CPT | Performed by: DERMATOLOGY

## 2021-11-10 PROCEDURE — 11900 INJECT SKIN LESIONS </W 7: CPT | Performed by: DERMATOLOGY

## 2021-11-15 ENCOUNTER — E-ADVICE (OUTPATIENT)
Dept: INTEGRATIVE MEDICINE | Age: 31
End: 2021-11-15

## 2021-11-20 ENCOUNTER — E-ADVICE (OUTPATIENT)
Dept: URGENT CARE | Age: 31
End: 2021-11-20

## 2021-11-26 ENCOUNTER — TELEPHONE (OUTPATIENT)
Dept: DERMATOLOGY | Age: 31
End: 2021-11-26

## 2021-11-29 ENCOUNTER — E-ADVICE (OUTPATIENT)
Dept: INTEGRATIVE MEDICINE | Age: 31
End: 2021-11-29

## 2021-11-29 DIAGNOSIS — R61 GENERALIZED HYPERHIDROSIS: Primary | ICD-10-CM

## 2021-12-01 ENCOUNTER — IMAGING SERVICES (OUTPATIENT)
Dept: GENERAL RADIOLOGY | Age: 31
End: 2021-12-01
Attending: FAMILY MEDICINE

## 2021-12-01 ENCOUNTER — OFFICE VISIT (OUTPATIENT)
Dept: INTERNAL MEDICINE | Age: 31
End: 2021-12-01

## 2021-12-01 ENCOUNTER — LAB SERVICES (OUTPATIENT)
Dept: LAB | Age: 31
End: 2021-12-01

## 2021-12-01 VITALS
TEMPERATURE: 98.2 F | SYSTOLIC BLOOD PRESSURE: 124 MMHG | HEART RATE: 93 BPM | WEIGHT: 168.9 LBS | DIASTOLIC BLOOD PRESSURE: 70 MMHG | RESPIRATION RATE: 16 BRPM | BODY MASS INDEX: 23.65 KG/M2 | HEIGHT: 71 IN

## 2021-12-01 DIAGNOSIS — R61 GENERALIZED HYPERHIDROSIS: ICD-10-CM

## 2021-12-01 DIAGNOSIS — Z11.3 SCREEN FOR STD (SEXUALLY TRANSMITTED DISEASE): ICD-10-CM

## 2021-12-01 DIAGNOSIS — Z11.3 SCREEN FOR STD (SEXUALLY TRANSMITTED DISEASE): Primary | ICD-10-CM

## 2021-12-01 LAB
APPEARANCE UR: CLEAR
BACTERIA #/AREA URNS HPF: ABNORMAL /HPF
BASOPHILS # BLD: 0.1 K/MCL (ref 0–0.3)
BASOPHILS NFR BLD: 1 %
BILIRUB UR QL STRIP: NEGATIVE
CAOX CRY URNS QL MICRO: PRESENT
COLOR UR: YELLOW
CRP SERPL-MCNC: 1 MG/DL
DEPRECATED RDW RBC: 43.5 FL (ref 39–50)
EOSINOPHIL # BLD: 0.1 K/MCL (ref 0–0.5)
EOSINOPHIL NFR BLD: 2 %
ERYTHROCYTE [DISTWIDTH] IN BLOOD: 13.4 % (ref 11–15)
GLUCOSE UR STRIP-MCNC: NEGATIVE MG/DL
HCT VFR BLD CALC: 41.5 % (ref 39–51)
HGB BLD-MCNC: 13.8 G/DL (ref 13–17)
HGB UR QL STRIP: ABNORMAL
HYALINE CASTS #/AREA URNS LPF: ABNORMAL /LPF
IMM GRANULOCYTES # BLD AUTO: 0 K/MCL (ref 0–0.2)
IMM GRANULOCYTES # BLD: 0 %
KETONES UR STRIP-MCNC: NEGATIVE MG/DL
LEUKOCYTE ESTERASE UR QL STRIP: NEGATIVE
LYMPHOCYTES # BLD: 1.3 K/MCL (ref 1–4.8)
LYMPHOCYTES NFR BLD: 20 %
MCH RBC QN AUTO: 29.4 PG (ref 26–34)
MCHC RBC AUTO-ENTMCNC: 33.3 G/DL (ref 32–36.5)
MCV RBC AUTO: 88.5 FL (ref 78–100)
MONOCYTES # BLD: 1 K/MCL (ref 0.3–0.9)
MONOCYTES NFR BLD: 15 %
MUCOUS THREADS URNS QL MICRO: PRESENT
NEUTROPHILS # BLD: 4 K/MCL (ref 1.8–7.7)
NEUTROPHILS NFR BLD: 62 %
NITRITE UR QL STRIP: NEGATIVE
NRBC BLD MANUAL-RTO: 0 /100 WBC
PH UR STRIP: 6 [PH] (ref 5–7)
PLATELET # BLD AUTO: 285 K/MCL (ref 140–450)
PROT UR STRIP-MCNC: NEGATIVE MG/DL
RBC # BLD: 4.69 MIL/MCL (ref 4.5–5.9)
RBC #/AREA URNS HPF: ABNORMAL /HPF
SP GR UR STRIP: >=1.03 (ref 1–1.03)
SQUAMOUS #/AREA URNS HPF: ABNORMAL /HPF
UROBILINOGEN UR STRIP-MCNC: 0.2 MG/DL
WBC # BLD: 6.5 K/MCL (ref 4.2–11)
WBC #/AREA URNS HPF: ABNORMAL /HPF

## 2021-12-01 PROCEDURE — 85025 COMPLETE CBC W/AUTO DIFF WBC: CPT | Performed by: INTERNAL MEDICINE

## 2021-12-01 PROCEDURE — 99213 OFFICE O/P EST LOW 20 MIN: CPT | Performed by: INTERNAL MEDICINE

## 2021-12-01 PROCEDURE — 71046 X-RAY EXAM CHEST 2 VIEWS: CPT | Performed by: RADIOLOGY

## 2021-12-01 PROCEDURE — 36415 COLL VENOUS BLD VENIPUNCTURE: CPT | Performed by: INTERNAL MEDICINE

## 2021-12-01 PROCEDURE — 81001 URINALYSIS AUTO W/SCOPE: CPT | Performed by: INTERNAL MEDICINE

## 2021-12-01 PROCEDURE — 86140 C-REACTIVE PROTEIN: CPT | Performed by: INTERNAL MEDICINE

## 2021-12-01 PROCEDURE — 86480 TB TEST CELL IMMUN MEASURE: CPT | Performed by: INTERNAL MEDICINE

## 2021-12-01 PROCEDURE — 87801 DETECT AGNT MULT DNA AMPLI: CPT | Performed by: INTERNAL MEDICINE

## 2021-12-01 PROCEDURE — 87040 BLOOD CULTURE FOR BACTERIA: CPT | Performed by: INTERNAL MEDICINE

## 2021-12-02 LAB
C TRACH RRNA UR QL NAA+PROBE: NEGATIVE
GAMMA INTERFERON BACKGROUND BLD IA-ACNC: 0.07 IU/ML
Lab: NORMAL
M TB IFN-G BLD-IMP: NEGATIVE
M TB IFN-G CD4+ BCKGRND COR BLD-ACNC: 0 IU/ML
M TB IFN-G CD4+CD8+ BCKGRND COR BLD-ACNC: 0 IU/ML
MITOGEN IGNF BCKGRD COR BLD-ACNC: >10 IU/ML
N GONORRHOEA RRNA UR QL NAA+PROBE: NEGATIVE

## 2021-12-03 ENCOUNTER — E-ADVICE (OUTPATIENT)
Dept: INTEGRATIVE MEDICINE | Age: 31
End: 2021-12-03

## 2021-12-03 DIAGNOSIS — R31.29 MICROSCOPIC HEMATURIA: Primary | ICD-10-CM

## 2021-12-06 ENCOUNTER — TELEPHONE (OUTPATIENT)
Dept: INTEGRATIVE MEDICINE | Age: 31
End: 2021-12-06

## 2021-12-06 LAB
BACTERIA BLD CULT: NORMAL
BACTERIA BLD CULT: NORMAL

## 2021-12-07 ENCOUNTER — V-VISIT (OUTPATIENT)
Dept: INTEGRATIVE MEDICINE | Age: 31
End: 2021-12-07

## 2021-12-07 DIAGNOSIS — R73.03 PREDIABETES: ICD-10-CM

## 2021-12-07 DIAGNOSIS — K58.1 IRRITABLE BOWEL SYNDROME WITH CONSTIPATION: Primary | ICD-10-CM

## 2021-12-07 DIAGNOSIS — U07.1 COVID-19 VIRUS INFECTION: ICD-10-CM

## 2021-12-07 DIAGNOSIS — R61 HYPERHIDROSIS: ICD-10-CM

## 2021-12-07 PROCEDURE — 99215 OFFICE O/P EST HI 40 MIN: CPT | Performed by: FAMILY MEDICINE

## 2021-12-21 ENCOUNTER — APPOINTMENT (OUTPATIENT)
Dept: LAB | Age: 31
End: 2021-12-21

## 2021-12-21 ENCOUNTER — OFFICE VISIT (OUTPATIENT)
Dept: INTERNAL MEDICINE | Age: 31
End: 2021-12-21

## 2021-12-21 VITALS
SYSTOLIC BLOOD PRESSURE: 136 MMHG | BODY MASS INDEX: 23.8 KG/M2 | HEIGHT: 71 IN | WEIGHT: 170 LBS | OXYGEN SATURATION: 99 % | DIASTOLIC BLOOD PRESSURE: 84 MMHG | TEMPERATURE: 97.6 F | HEART RATE: 93 BPM

## 2021-12-21 DIAGNOSIS — R31.29 HEMATURIA, MICROSCOPIC: Primary | ICD-10-CM

## 2021-12-21 LAB
APPEARANCE UR: ABNORMAL
BACTERIA #/AREA URNS HPF: ABNORMAL /HPF
BILIRUB UR QL STRIP: NEGATIVE
COLOR UR: YELLOW
GLUCOSE UR STRIP-MCNC: NEGATIVE MG/DL
HGB UR QL STRIP: NEGATIVE
HYALINE CASTS #/AREA URNS LPF: ABNORMAL /LPF
KETONES UR STRIP-MCNC: NEGATIVE MG/DL
LEUKOCYTE ESTERASE UR QL STRIP: NEGATIVE
MUCOUS THREADS URNS QL MICRO: PRESENT
NITRITE UR QL STRIP: NEGATIVE
PH UR STRIP: 6 [PH] (ref 5–7)
PROT UR STRIP-MCNC: NEGATIVE MG/DL
RBC #/AREA URNS HPF: ABNORMAL /HPF
SP GR UR STRIP: 1.02 (ref 1–1.03)
SQUAMOUS #/AREA URNS HPF: ABNORMAL /HPF
UROBILINOGEN UR STRIP-MCNC: 0.2 MG/DL
WBC #/AREA URNS HPF: ABNORMAL /HPF

## 2021-12-21 PROCEDURE — 99213 OFFICE O/P EST LOW 20 MIN: CPT | Performed by: INTERNAL MEDICINE

## 2021-12-21 PROCEDURE — 87086 URINE CULTURE/COLONY COUNT: CPT | Performed by: INTERNAL MEDICINE

## 2021-12-21 PROCEDURE — 81001 URINALYSIS AUTO W/SCOPE: CPT | Performed by: INTERNAL MEDICINE

## 2021-12-22 LAB — BACTERIA UR CULT: NORMAL

## 2021-12-27 ENCOUNTER — TELEPHONE (OUTPATIENT)
Dept: INTERNAL MEDICINE | Age: 31
End: 2021-12-27

## 2022-01-04 ENCOUNTER — OFFICE VISIT (OUTPATIENT)
Dept: PODIATRY | Age: 32
End: 2022-01-04

## 2022-01-04 DIAGNOSIS — B07.0 VERRUCA PEDIS: Primary | ICD-10-CM

## 2022-01-04 DIAGNOSIS — R61 HYPERHIDROSIS: ICD-10-CM

## 2022-01-04 DIAGNOSIS — M79.672 LEFT FOOT PAIN: ICD-10-CM

## 2022-01-04 PROCEDURE — 17110 DESTRUCTION B9 LES UP TO 14: CPT | Performed by: PODIATRIST

## 2022-01-04 PROCEDURE — 99203 OFFICE O/P NEW LOW 30 MIN: CPT | Performed by: PODIATRIST

## 2022-01-04 RX ORDER — FLUOROURACIL 50 MG/G
CREAM TOPICAL
Qty: 40 G | Refills: 2 | Status: SHIPPED | OUTPATIENT
Start: 2022-01-04 | End: 2022-05-31 | Stop reason: ALTCHOICE

## 2022-01-12 ENCOUNTER — OFFICE VISIT (OUTPATIENT)
Dept: ENDOCRINOLOGY | Age: 32
End: 2022-01-12

## 2022-01-12 ENCOUNTER — E-ADVICE (OUTPATIENT)
Dept: ENDOCRINOLOGY | Age: 32
End: 2022-01-12

## 2022-01-12 ENCOUNTER — LAB SERVICES (OUTPATIENT)
Dept: LAB | Age: 32
End: 2022-01-12

## 2022-01-12 VITALS
BODY MASS INDEX: 24.18 KG/M2 | WEIGHT: 173.39 LBS | SYSTOLIC BLOOD PRESSURE: 122 MMHG | DIASTOLIC BLOOD PRESSURE: 90 MMHG | RESPIRATION RATE: 16 BRPM | HEART RATE: 76 BPM

## 2022-01-12 DIAGNOSIS — R31.29 MICROSCOPIC HEMATURIA: ICD-10-CM

## 2022-01-12 DIAGNOSIS — R61 HYPERHIDROSIS: Primary | ICD-10-CM

## 2022-01-12 DIAGNOSIS — R61 HYPERHIDROSIS: ICD-10-CM

## 2022-01-12 LAB
25(OH)D3+25(OH)D2 SERPL-MCNC: 9.8 NG/ML (ref 30–100)
APPEARANCE UR: CLEAR
BACTERIA #/AREA URNS HPF: ABNORMAL /HPF
BILIRUB UR QL STRIP: NEGATIVE
COLOR UR: YELLOW
FOLATE SERPL-MCNC: 7.3 NG/ML
GLUCOSE UR STRIP-MCNC: NEGATIVE MG/DL
HGB UR QL STRIP: NEGATIVE
HYALINE CASTS #/AREA URNS LPF: ABNORMAL /LPF
KETONES UR STRIP-MCNC: NEGATIVE MG/DL
LEUKOCYTE ESTERASE UR QL STRIP: NEGATIVE
NITRITE UR QL STRIP: NEGATIVE
PH UR STRIP: 7.5 [PH] (ref 5–7)
PROT UR STRIP-MCNC: NEGATIVE MG/DL
RBC #/AREA URNS HPF: ABNORMAL /HPF
SP GR UR STRIP: 1.02 (ref 1–1.03)
SPERM URNS QL MICRO: PRESENT
SQUAMOUS #/AREA URNS HPF: ABNORMAL /HPF
T3FREE SERPL-MCNC: 3 PG/ML (ref 2.2–4)
T4 FREE SERPL-MCNC: 0.9 NG/DL (ref 0.8–1.5)
TSH SERPL-ACNC: 1.15 MCUNITS/ML (ref 0.35–5)
UROBILINOGEN UR STRIP-MCNC: 0.2 MG/DL
VIT B12 SERPL-MCNC: 749 PG/ML (ref 211–911)
WBC #/AREA URNS HPF: ABNORMAL /HPF

## 2022-01-12 PROCEDURE — 84443 ASSAY THYROID STIM HORMONE: CPT | Performed by: INTERNAL MEDICINE

## 2022-01-12 PROCEDURE — 82746 ASSAY OF FOLIC ACID SERUM: CPT | Performed by: INTERNAL MEDICINE

## 2022-01-12 PROCEDURE — 82306 VITAMIN D 25 HYDROXY: CPT | Performed by: INTERNAL MEDICINE

## 2022-01-12 PROCEDURE — 82607 VITAMIN B-12: CPT | Performed by: INTERNAL MEDICINE

## 2022-01-12 PROCEDURE — 83835 ASSAY OF METANEPHRINES: CPT | Performed by: INTERNAL MEDICINE

## 2022-01-12 PROCEDURE — 84439 ASSAY OF FREE THYROXINE: CPT | Performed by: INTERNAL MEDICINE

## 2022-01-12 PROCEDURE — 84305 ASSAY OF SOMATOMEDIN: CPT | Performed by: INTERNAL MEDICINE

## 2022-01-12 PROCEDURE — 36415 COLL VENOUS BLD VENIPUNCTURE: CPT | Performed by: INTERNAL MEDICINE

## 2022-01-12 PROCEDURE — 99204 OFFICE O/P NEW MOD 45 MIN: CPT | Performed by: INTERNAL MEDICINE

## 2022-01-12 PROCEDURE — 81015 MICROSCOPIC EXAM OF URINE: CPT | Performed by: INTERNAL MEDICINE

## 2022-01-12 PROCEDURE — 84481 FREE ASSAY (FT-3): CPT | Performed by: INTERNAL MEDICINE

## 2022-01-13 ENCOUNTER — TELEPHONE (OUTPATIENT)
Dept: ENDOCRINOLOGY | Age: 32
End: 2022-01-13

## 2022-01-13 ENCOUNTER — TELEPHONE (OUTPATIENT)
Dept: DERMATOLOGY | Age: 32
End: 2022-01-13

## 2022-01-13 DIAGNOSIS — E55.9 VITAMIN D DEFICIENCY: Primary | ICD-10-CM

## 2022-01-13 DIAGNOSIS — L74.510 HYPERHIDROSIS OF AXILLA: ICD-10-CM

## 2022-01-13 RX ORDER — ONABOTULINUMTOXINA 100 [USP'U]/1
INJECTION, POWDER, LYOPHILIZED, FOR SOLUTION INTRADERMAL; INTRAMUSCULAR
Qty: 1 EACH | Refills: 2 | Status: SHIPPED | OUTPATIENT
Start: 2022-01-13 | End: 2022-04-11 | Stop reason: ALTCHOICE

## 2022-01-13 RX ORDER — ERGOCALCIFEROL 1.25 MG/1
1.25 CAPSULE ORAL
Qty: 12 CAPSULE | Refills: 1 | Status: SHIPPED | OUTPATIENT
Start: 2022-01-17 | End: 2022-05-31 | Stop reason: ALTCHOICE

## 2022-01-14 LAB — IGF-I SERPL-MCNC: 168 NG/ML (ref 71.2–234)

## 2022-01-19 LAB
ANNOTATION COMMENT IMP: NORMAL
METANEPHS SERPL-SCNC: 0.3 NMOL/L (ref 0–0.49)
NORMETANEPHRINE SERPL-SCNC: 0.57 NMOL/L (ref 0–0.89)

## 2022-01-24 ENCOUNTER — OFFICE VISIT (OUTPATIENT)
Dept: INTERNAL MEDICINE | Age: 32
End: 2022-01-24

## 2022-01-24 ENCOUNTER — APPOINTMENT (OUTPATIENT)
Dept: LAB | Age: 32
End: 2022-01-24

## 2022-01-24 ENCOUNTER — E-ADVICE (OUTPATIENT)
Dept: ENDOCRINOLOGY | Age: 32
End: 2022-01-24

## 2022-01-24 VITALS
BODY MASS INDEX: 24.47 KG/M2 | TEMPERATURE: 98.3 F | SYSTOLIC BLOOD PRESSURE: 120 MMHG | DIASTOLIC BLOOD PRESSURE: 80 MMHG | WEIGHT: 174.8 LBS | HEART RATE: 76 BPM | RESPIRATION RATE: 18 BRPM | HEIGHT: 71 IN

## 2022-01-24 DIAGNOSIS — R73.03 PREDIABETES: Primary | ICD-10-CM

## 2022-01-24 DIAGNOSIS — R53.81 MALAISE AND FATIGUE: Primary | ICD-10-CM

## 2022-01-24 DIAGNOSIS — R53.83 MALAISE AND FATIGUE: Primary | ICD-10-CM

## 2022-01-24 LAB
FSH SERPL-ACNC: 2.4 MUNITS/ML (ref 1.4–18.1)
LH SERPL-ACNC: 2.8 MUNITS/ML (ref 1.5–9.3)
TESTOST SERPL-MCNC: 555.4 NG/DL (ref 280–1100)

## 2022-01-24 PROCEDURE — 82670 ASSAY OF TOTAL ESTRADIOL: CPT | Performed by: INTERNAL MEDICINE

## 2022-01-24 PROCEDURE — 95251 CONT GLUC MNTR ANALYSIS I&R: CPT | Performed by: INTERNAL MEDICINE

## 2022-01-24 PROCEDURE — 36415 COLL VENOUS BLD VENIPUNCTURE: CPT | Performed by: INTERNAL MEDICINE

## 2022-01-24 PROCEDURE — 83002 ASSAY OF GONADOTROPIN (LH): CPT | Performed by: INTERNAL MEDICINE

## 2022-01-24 PROCEDURE — 84403 ASSAY OF TOTAL TESTOSTERONE: CPT | Performed by: INTERNAL MEDICINE

## 2022-01-24 PROCEDURE — 99213 OFFICE O/P EST LOW 20 MIN: CPT | Performed by: INTERNAL MEDICINE

## 2022-01-24 PROCEDURE — 83001 ASSAY OF GONADOTROPIN (FSH): CPT | Performed by: INTERNAL MEDICINE

## 2022-01-24 RX ORDER — GLYCOPYRROLATE 1 MG/1
TABLET ORAL
COMMUNITY
Start: 2022-01-20

## 2022-01-24 RX ORDER — NORTRIPTYLINE HYDROCHLORIDE 10 MG/1
10 CAPSULE ORAL NIGHTLY
Qty: 30 CAPSULE | Refills: 1 | Status: SHIPPED | OUTPATIENT
Start: 2022-01-24 | End: 2022-03-01

## 2022-01-25 LAB — ESTRADIOL SERPL-MCNC: 32 PG/ML

## 2022-01-27 ENCOUNTER — TELEPHONE (OUTPATIENT)
Dept: DERMATOLOGY | Age: 32
End: 2022-01-27

## 2022-01-31 ENCOUNTER — OFFICE VISIT (OUTPATIENT)
Dept: DERMATOLOGY | Age: 32
End: 2022-01-31

## 2022-01-31 DIAGNOSIS — R61 EXCESSIVE SWEATING: ICD-10-CM

## 2022-01-31 DIAGNOSIS — L28.1 PRURIGO NODULARIS: ICD-10-CM

## 2022-01-31 DIAGNOSIS — L74.510 HYPERHIDROSIS OF AXILLA: Primary | ICD-10-CM

## 2022-01-31 PROCEDURE — 64650 CHEMODENERV ECCRINE GLANDS: CPT | Performed by: DERMATOLOGY

## 2022-01-31 PROCEDURE — 11306 SHAVE SKIN LESION 0.6-1.0 CM: CPT | Performed by: DERMATOLOGY

## 2022-01-31 RX ORDER — GLYCOPYRRONIUM 2.4 G/100G
1 CLOTH TOPICAL DAILY
Qty: 30 EACH | Refills: 4 | Status: SHIPPED | OUTPATIENT
Start: 2022-01-31 | End: 2022-02-15 | Stop reason: SDUPTHER

## 2022-02-02 ENCOUNTER — OFFICE VISIT (OUTPATIENT)
Dept: RHEUMATOLOGY | Age: 32
End: 2022-02-02
Attending: INTERNAL MEDICINE

## 2022-02-02 ENCOUNTER — LAB SERVICES (OUTPATIENT)
Dept: LAB | Age: 32
End: 2022-02-02

## 2022-02-02 VITALS
OXYGEN SATURATION: 98 % | SYSTOLIC BLOOD PRESSURE: 132 MMHG | BODY MASS INDEX: 24.48 KG/M2 | HEART RATE: 78 BPM | DIASTOLIC BLOOD PRESSURE: 78 MMHG | WEIGHT: 174.82 LBS | HEIGHT: 71 IN

## 2022-02-02 DIAGNOSIS — I73.00 RAYNAUD'S DISEASE WITHOUT GANGRENE: ICD-10-CM

## 2022-02-02 DIAGNOSIS — R61 HYPERHIDROSIS: Primary | ICD-10-CM

## 2022-02-02 LAB — ERYTHROCYTE [SEDIMENTATION RATE] IN BLOOD BY WESTERGREN METHOD: 16 MM/HR (ref 0–20)

## 2022-02-02 PROCEDURE — 99204 OFFICE O/P NEW MOD 45 MIN: CPT | Performed by: INTERNAL MEDICINE

## 2022-02-02 PROCEDURE — 86140 C-REACTIVE PROTEIN: CPT | Performed by: INTERNAL MEDICINE

## 2022-02-02 PROCEDURE — 86038 ANTINUCLEAR ANTIBODIES: CPT | Performed by: INTERNAL MEDICINE

## 2022-02-02 PROCEDURE — 36415 COLL VENOUS BLD VENIPUNCTURE: CPT | Performed by: INTERNAL MEDICINE

## 2022-02-02 PROCEDURE — 85652 RBC SED RATE AUTOMATED: CPT | Performed by: INTERNAL MEDICINE

## 2022-02-02 ASSESSMENT — ENCOUNTER SYMPTOMS
FEVER: 0
WEAKNESS: 0
VOMITING: 0
HEARTBURN: 0
EYE PAIN: 0
DIZZINESS: 0
DEPRESSION: 0
ABDOMINAL PAIN: 0
CHILLS: 0
HEADACHES: 0
COUGH: 0
SENSORY CHANGE: 0
WEIGHT LOSS: 0
SPUTUM PRODUCTION: 0
EYE REDNESS: 0
ROS SKIN COMMENTS: NO RAYNAUDS. NO ALOPECIA. NO PHOTOSENSITIVITY.
SHORTNESS OF BREATH: 0
DIARRHEA: 0

## 2022-02-02 ASSESSMENT — JOINT PAIN
TOTAL NUMBER OF SWOLLEN JOINTS: 0
TOTAL NUMBER OF TENDER JOINTS: 0

## 2022-02-03 LAB
ANA SER QL IA: NEGATIVE
CRP SERPL-MCNC: <0.3 MG/DL

## 2022-02-04 ENCOUNTER — TELEPHONE (OUTPATIENT)
Dept: ENDOCRINOLOGY | Age: 32
End: 2022-02-04

## 2022-02-14 ENCOUNTER — E-ADVICE (OUTPATIENT)
Dept: DERMATOLOGY | Age: 32
End: 2022-02-14

## 2022-02-14 DIAGNOSIS — L74.510 HYPERHIDROSIS OF AXILLA: ICD-10-CM

## 2022-02-15 RX ORDER — GLYCOPYRRONIUM 2.4 G/100G
1 CLOTH TOPICAL DAILY
Qty: 30 EACH | Refills: 11 | Status: SHIPPED | OUTPATIENT
Start: 2022-02-15 | End: 2022-05-31 | Stop reason: ALTCHOICE

## 2022-02-23 ENCOUNTER — E-ADVICE (OUTPATIENT)
Dept: ENDOCRINOLOGY | Age: 32
End: 2022-02-23

## 2022-02-24 ENCOUNTER — E-ADVICE (OUTPATIENT)
Dept: INTERNAL MEDICINE | Age: 32
End: 2022-02-24

## 2022-02-24 DIAGNOSIS — R61 HYPERHIDROSIS: Primary | ICD-10-CM

## 2022-02-25 ENCOUNTER — TELEPHONE (OUTPATIENT)
Dept: NEUROLOGY | Age: 32
End: 2022-02-25

## 2022-02-25 ENCOUNTER — TELEPHONE (OUTPATIENT)
Dept: INTERNAL MEDICINE | Age: 32
End: 2022-02-25

## 2022-02-28 ENCOUNTER — TELEPHONE (OUTPATIENT)
Dept: NEUROLOGY | Age: 32
End: 2022-02-28

## 2022-02-28 ENCOUNTER — E-ADVICE (OUTPATIENT)
Dept: DERMATOLOGY | Age: 32
End: 2022-02-28

## 2022-02-28 ENCOUNTER — OFFICE VISIT (OUTPATIENT)
Dept: DERMATOLOGY | Age: 32
End: 2022-02-28

## 2022-02-28 ENCOUNTER — TELEPHONE (OUTPATIENT)
Dept: TELEHEALTH | Age: 32
End: 2022-02-28

## 2022-02-28 DIAGNOSIS — L28.1 PRURIGO NODULARIS: ICD-10-CM

## 2022-02-28 DIAGNOSIS — L74.510 HYPERHIDROSIS OF AXILLA: Primary | ICD-10-CM

## 2022-02-28 DIAGNOSIS — R61 EXCESSIVE SWEATING: ICD-10-CM

## 2022-02-28 PROCEDURE — 11900 INJECT SKIN LESIONS </W 7: CPT | Performed by: DERMATOLOGY

## 2022-02-28 PROCEDURE — 99214 OFFICE O/P EST MOD 30 MIN: CPT | Performed by: DERMATOLOGY

## 2022-02-28 RX ORDER — METOPROLOL SUCCINATE 25 MG/1
25 TABLET, EXTENDED RELEASE ORAL DAILY
Qty: 30 TABLET | Refills: 1 | Status: SHIPPED | OUTPATIENT
Start: 2022-02-28 | End: 2022-05-31 | Stop reason: ALTCHOICE

## 2022-03-01 ENCOUNTER — TELEPHONE (OUTPATIENT)
Dept: DERMATOLOGY | Age: 32
End: 2022-03-01

## 2022-03-01 RX ORDER — NORTRIPTYLINE HYDROCHLORIDE 10 MG/1
CAPSULE ORAL
Qty: 90 CAPSULE | Refills: 1 | Status: SHIPPED | OUTPATIENT
Start: 2022-03-01 | End: 2023-08-11 | Stop reason: ALTCHOICE

## 2022-03-03 ENCOUNTER — E-ADVICE (OUTPATIENT)
Dept: INTERNAL MEDICINE | Age: 32
End: 2022-03-03

## 2022-03-07 ENCOUNTER — IMAGING SERVICES (OUTPATIENT)
Dept: CV DIAGNOSTICS | Age: 32
End: 2022-03-07
Attending: INTERNAL MEDICINE

## 2022-03-07 ENCOUNTER — OFFICE VISIT (OUTPATIENT)
Dept: CARDIOLOGY | Age: 32
End: 2022-03-07
Attending: INTERNAL MEDICINE

## 2022-03-07 VITALS
WEIGHT: 173 LBS | BODY MASS INDEX: 24.22 KG/M2 | HEIGHT: 71 IN | HEART RATE: 84 BPM | DIASTOLIC BLOOD PRESSURE: 78 MMHG | SYSTOLIC BLOOD PRESSURE: 110 MMHG

## 2022-03-07 DIAGNOSIS — R00.2 PALPITATIONS: ICD-10-CM

## 2022-03-07 DIAGNOSIS — R00.0 TACHYCARDIA, UNSPECIFIED: ICD-10-CM

## 2022-03-07 DIAGNOSIS — R61 HYPERHIDROSIS: Primary | ICD-10-CM

## 2022-03-07 DIAGNOSIS — R61 HYPERHIDROSIS: ICD-10-CM

## 2022-03-07 PROCEDURE — 93356 MYOCRD STRAIN IMG SPCKL TRCK: CPT | Performed by: INTERNAL MEDICINE

## 2022-03-07 PROCEDURE — 93308 TTE F-UP OR LMTD: CPT | Performed by: INTERNAL MEDICINE

## 2022-03-07 PROCEDURE — 93325 DOPPLER ECHO COLOR FLOW MAPG: CPT | Performed by: INTERNAL MEDICINE

## 2022-03-07 PROCEDURE — 99204 OFFICE O/P NEW MOD 45 MIN: CPT | Performed by: INTERNAL MEDICINE

## 2022-03-07 PROCEDURE — 93321 DOPPLER ECHO F-UP/LMTD STD: CPT | Performed by: INTERNAL MEDICINE

## 2022-03-08 ENCOUNTER — TELEPHONE (OUTPATIENT)
Dept: CARDIOLOGY | Age: 32
End: 2022-03-08

## 2022-03-08 ENCOUNTER — E-ADVICE (OUTPATIENT)
Dept: DERMATOLOGY | Age: 32
End: 2022-03-08

## 2022-03-09 ENCOUNTER — OFFICE VISIT (OUTPATIENT)
Dept: LAB | Age: 32
End: 2022-03-09
Attending: INTERNAL MEDICINE

## 2022-03-09 DIAGNOSIS — R61 HYPERHIDROSIS: ICD-10-CM

## 2022-03-09 DIAGNOSIS — R00.2 PALPITATIONS: ICD-10-CM

## 2022-03-09 DIAGNOSIS — R00.0 TACHYCARDIA, UNSPECIFIED: Primary | ICD-10-CM

## 2022-03-09 PROCEDURE — 93227 XTRNL ECG REC<48 HR R&I: CPT | Performed by: INTERNAL MEDICINE

## 2022-03-16 ENCOUNTER — TELEPHONE (OUTPATIENT)
Dept: CARDIOLOGY | Age: 32
End: 2022-03-16

## 2022-03-16 ENCOUNTER — OFFICE VISIT (OUTPATIENT)
Dept: DERMATOLOGY | Age: 32
End: 2022-03-16

## 2022-03-16 DIAGNOSIS — L73.9 FOLLICULITIS: Primary | ICD-10-CM

## 2022-03-16 PROCEDURE — 87070 CULTURE OTHR SPECIMN AEROBIC: CPT | Performed by: INTERNAL MEDICINE

## 2022-03-16 PROCEDURE — 87205 SMEAR GRAM STAIN: CPT | Performed by: INTERNAL MEDICINE

## 2022-03-16 PROCEDURE — 99213 OFFICE O/P EST LOW 20 MIN: CPT | Performed by: DERMATOLOGY

## 2022-03-16 PROCEDURE — 87147 CULTURE TYPE IMMUNOLOGIC: CPT | Performed by: INTERNAL MEDICINE

## 2022-03-16 PROCEDURE — 87186 SC STD MICRODIL/AGAR DIL: CPT | Performed by: INTERNAL MEDICINE

## 2022-03-16 RX ORDER — MINOCYCLINE HYDROCHLORIDE 100 MG/1
100 CAPSULE ORAL 2 TIMES DAILY
Qty: 60 CAPSULE | Refills: 1 | Status: SHIPPED | OUTPATIENT
Start: 2022-03-16 | End: 2022-04-13 | Stop reason: ALTCHOICE

## 2022-03-19 LAB
BACTERIA SPEC AEROBE CULT: ABNORMAL
BACTERIA SPEC AEROBE CULT: ABNORMAL
GRAM STN SPEC: ABNORMAL

## 2022-03-24 ENCOUNTER — EXTERNAL RECORD (OUTPATIENT)
Dept: OTHER | Age: 32
End: 2022-03-24

## 2022-03-25 ENCOUNTER — TELEPHONE (OUTPATIENT)
Dept: DERMATOLOGY | Age: 32
End: 2022-03-25

## 2022-03-26 ENCOUNTER — E-ADVICE (OUTPATIENT)
Dept: DERMATOLOGY | Age: 32
End: 2022-03-26

## 2022-03-27 LAB
AORTIC VALVE AREA: 2.71 CMÂ²
ASCENDING AORTA (AAD): 3.04 CM
AV MEAN GRADIENT (AVMG): 2.85 MMHG
AV PEAK GRADIENT (AVPG): 5.25 MMHG
AV PEAK VELOCITY (AVPV): 1.1 M/S
AVI LVOT PEAK GRADIENT (LVOTMG): 1.47 MMHG
DOP CALC LVOT PEAK VEL (LVOTPV): 0.9 M/S
E WAVE DECELARATION TIME (MDT): 0.16 S
INTERVENTRICULAR SEPTUM IN END DIASTOLE (IVSD): 1.13 CM
LEFT INTERNAL DIMENSION IN SYSTOLE (LVSD): 2.98 CM
LEFT VENTRICULAR INTERNAL DIMENSION IN DIASTOLE (LVDD): 4.56 CM
LEFT VENTRICULAR POSTERIOR WALL IN END DIASTOLE (LVPW): 1.16 CM
LV EF: 59 %
LV END SYSTOLIC LONGITUDINAL STRAIN GLOBAL (LVGS): -18 %
LVOT 2D (LVOTD): 2.12 CM
LVOT VTI (LVOTVTI): 16.25 CM
MV E TISSUE VEL LAT (MELV): 0.1 M/S
MV E TISSUE VEL MED (MESV): 0.1 M/S
MV E WAVE VEL/E TISSUE VEL MED(MSR): 7.19 UNITLESS
MV PEAK A VELOCITY (MVPAV): 0.5 M/S
MV PEAK E VELOCITY (MVPEV): 0.7 M/S
RV END SYSTOLIC LONGITUDINAL STRAIN FREE WALL (RVGS): -13 %

## 2022-03-29 ENCOUNTER — OFFICE VISIT (OUTPATIENT)
Dept: DERMATOLOGY | Age: 32
End: 2022-03-29

## 2022-03-29 DIAGNOSIS — D48.5 NEOPLASM OF UNCERTAIN BEHAVIOR OF SKIN: Primary | ICD-10-CM

## 2022-03-29 PROCEDURE — 11421 EXC H-F-NK-SP B9+MARG 0.6-1: CPT | Performed by: DERMATOLOGY

## 2022-04-11 ENCOUNTER — TELEPHONE (OUTPATIENT)
Dept: DERMATOLOGY | Age: 32
End: 2022-04-11

## 2022-04-13 ENCOUNTER — OFFICE VISIT (OUTPATIENT)
Dept: DERMATOLOGY | Age: 32
End: 2022-04-13

## 2022-04-13 DIAGNOSIS — L74.510 HYPERHIDROSIS OF AXILLA: Primary | ICD-10-CM

## 2022-04-13 PROCEDURE — 64650 CHEMODENERV ECCRINE GLANDS: CPT | Performed by: DERMATOLOGY

## 2022-05-31 ENCOUNTER — OFFICE VISIT (OUTPATIENT)
Dept: DERMATOLOGY | Age: 32
End: 2022-05-31

## 2022-05-31 DIAGNOSIS — L28.1 PRURIGO NODULARIS: ICD-10-CM

## 2022-05-31 DIAGNOSIS — L91.0 HYPERTROPHIC SCAR OF SKIN: Primary | ICD-10-CM

## 2022-05-31 PROCEDURE — 11900 INJECT SKIN LESIONS </W 7: CPT | Performed by: DERMATOLOGY

## 2022-06-01 ENCOUNTER — TELEPHONE (OUTPATIENT)
Dept: SURGERY | Age: 32
End: 2022-06-01

## 2022-06-06 ENCOUNTER — TELEPHONE (OUTPATIENT)
Dept: SURGERY | Age: 32
End: 2022-06-06

## 2022-06-13 ENCOUNTER — OFFICE VISIT (OUTPATIENT)
Dept: SURGERY | Age: 32
End: 2022-06-13

## 2022-06-13 VITALS
HEIGHT: 71 IN | BODY MASS INDEX: 24.4 KG/M2 | DIASTOLIC BLOOD PRESSURE: 82 MMHG | OXYGEN SATURATION: 97 % | HEART RATE: 77 BPM | SYSTOLIC BLOOD PRESSURE: 116 MMHG | WEIGHT: 174.27 LBS

## 2022-06-13 DIAGNOSIS — L91.0 KELOID: Primary | ICD-10-CM

## 2022-06-13 PROCEDURE — 99203 OFFICE O/P NEW LOW 30 MIN: CPT | Performed by: PLASTIC SURGERY

## 2022-06-15 ENCOUNTER — E-ADVICE (OUTPATIENT)
Dept: DERMATOLOGY | Age: 32
End: 2022-06-15

## 2022-06-21 ENCOUNTER — NURSE TRIAGE (OUTPATIENT)
Dept: TELEHEALTH | Age: 32
End: 2022-06-21

## 2022-06-21 ENCOUNTER — E-ADVICE (OUTPATIENT)
Dept: DERMATOLOGY | Age: 32
End: 2022-06-21

## 2022-06-27 ENCOUNTER — APPOINTMENT (OUTPATIENT)
Dept: DERMATOLOGY | Age: 32
End: 2022-06-27

## 2022-06-28 ENCOUNTER — TELEPHONE (OUTPATIENT)
Dept: TELEHEALTH | Age: 32
End: 2022-06-28

## 2022-06-29 ENCOUNTER — APPOINTMENT (OUTPATIENT)
Dept: DERMATOLOGY | Age: 32
End: 2022-06-29

## 2022-07-05 ENCOUNTER — TELEPHONE (OUTPATIENT)
Dept: DERMATOLOGY | Age: 32
End: 2022-07-05

## 2022-07-15 ENCOUNTER — TELEPHONE (OUTPATIENT)
Dept: DERMATOLOGY | Age: 32
End: 2022-07-15

## 2022-07-15 DIAGNOSIS — L74.510 HYPERHIDROSIS OF AXILLA: Primary | ICD-10-CM

## 2022-07-19 ENCOUNTER — OFFICE VISIT (OUTPATIENT)
Dept: DERMATOLOGY | Age: 32
End: 2022-07-19

## 2022-07-19 DIAGNOSIS — L74.510 HYPERHIDROSIS OF AXILLA: Primary | ICD-10-CM

## 2022-07-19 PROCEDURE — 64650 CHEMODENERV ECCRINE GLANDS: CPT | Performed by: DERMATOLOGY

## 2022-07-27 ENCOUNTER — APPOINTMENT (OUTPATIENT)
Dept: DERMATOLOGY | Age: 32
End: 2022-07-27

## 2022-08-30 ENCOUNTER — OFFICE VISIT (OUTPATIENT)
Dept: FAMILY MEDICINE CLINIC | Facility: CLINIC | Age: 32
End: 2022-08-30
Payer: COMMERCIAL

## 2022-08-30 VITALS
HEIGHT: 71 IN | OXYGEN SATURATION: 99 % | TEMPERATURE: 98 F | RESPIRATION RATE: 16 BRPM | HEART RATE: 96 BPM | WEIGHT: 180.38 LBS | DIASTOLIC BLOOD PRESSURE: 72 MMHG | SYSTOLIC BLOOD PRESSURE: 100 MMHG | BODY MASS INDEX: 25.25 KG/M2

## 2022-08-30 DIAGNOSIS — R61 HYPERHIDROSIS: ICD-10-CM

## 2022-08-30 DIAGNOSIS — U09.9 COVID-19 LONG HAULER: ICD-10-CM

## 2022-08-30 DIAGNOSIS — Z76.89 ESTABLISHING CARE WITH NEW DOCTOR, ENCOUNTER FOR: Primary | ICD-10-CM

## 2022-08-30 PROCEDURE — 3078F DIAST BP <80 MM HG: CPT | Performed by: FAMILY MEDICINE

## 2022-08-30 PROCEDURE — 99204 OFFICE O/P NEW MOD 45 MIN: CPT | Performed by: FAMILY MEDICINE

## 2022-08-30 PROCEDURE — 3008F BODY MASS INDEX DOCD: CPT | Performed by: FAMILY MEDICINE

## 2022-08-30 PROCEDURE — 3074F SYST BP LT 130 MM HG: CPT | Performed by: FAMILY MEDICINE

## 2022-08-30 NOTE — PATIENT INSTRUCTIONS
Encouraged physical fitness and daily physical activity daily. Monitor blood pressures and record at home. Limit salt intake.   Keep all follow up appointments with [address]

## 2022-09-20 ENCOUNTER — PATIENT MESSAGE (OUTPATIENT)
Dept: FAMILY MEDICINE CLINIC | Facility: CLINIC | Age: 32
End: 2022-09-20

## 2022-09-20 DIAGNOSIS — R61 HYPERHIDROSIS: Primary | ICD-10-CM

## 2022-09-20 NOTE — TELEPHONE ENCOUNTER
From: [place]  To: Latisha Albert DO  Sent: 9/20/2022 6:30 AM CDT  Subject: Willette Scheuermann Dr. Jorja Pae,    Yesterday (9/19)I followed up with Dr. Romelia Bella (Neurologist) and we discussed a wholistic path forward. I appreciate his interest in wanting to keep my treatment multifaceted, and note solely medicated. Some of his suggestions were electrolytes, exercise, better sleep, diet, etc. These notes and all testing results should be available for your review. There was not much concern for my results, however during the table tilt test, signs that my body is in a heightened adnergic state was evident with regards to blood pressure. Lastly he mentioned trialing Clonidine 0.1mg once a day to reduce the fight or flight experience trigger by my brain. Are you also in support of trialing this medication, and if so can we discuss any side effects things I should look out for etc? I had done some research on this medication early and have seen that its common use is with menopausal hot flashes. Mark attached a similar case to mine in which a woman was having post covid hyperhidrosis episodes -   treated with Clonidine. Please let me know your thoughts and considerations for a path forward. Post Covid Infection Clonidine Study 2021:   [email or web address]    Side Note: During the last visit we discussed having routine testing performed i.e. STD. Are you able to write a script for such testing for a [facility] Other facility?  Or might I have to come in for a visit to initiate the request?    Thanks,  Mary

## 2022-09-21 RX ORDER — CLONIDINE 0.1 MG/24H
1 PATCH, EXTENDED RELEASE TRANSDERMAL WEEKLY
Qty: 4 PATCH | Refills: 0 | Status: SHIPPED | OUTPATIENT
Start: 2022-09-21

## 2022-09-21 NOTE — TELEPHONE ENCOUNTER
Let the patient know that the low-dose once weekly patch has been sent to the pharmacy just for 1 month so that we can determine whether this will be effective him as well as work without side effect. Thank you.

## 2022-09-22 ENCOUNTER — TELEPHONE (OUTPATIENT)
Dept: FAMILY MEDICINE CLINIC | Facility: CLINIC | Age: 32
End: 2022-09-22

## 2022-09-22 RX ORDER — CLONIDINE HYDROCHLORIDE 0.1 MG/1
0.1 TABLET ORAL 2 TIMES DAILY
Qty: 60 TABLET | Refills: 0 | Status: SHIPPED | OUTPATIENT
Start: 2022-09-22

## 2022-09-22 NOTE — TELEPHONE ENCOUNTER
This duplicate encounter is unnecessary because the patient's Yadio messages have already been routed to the provider and this note below could have been documented in that encounter to prevent confusion and duplicates. Closing this. See Yadio message encounter.

## 2022-09-22 NOTE — TELEPHONE ENCOUNTER
Patient is following up on the [employer] message sent on 9/20. Per patient, pharmacy does not have the clonidine patch available. Patient is requesting a prescription for the pill form of clonidine. Patient's pharmacy has the pill form available and he would prefer taking a pill instead of using a patch.

## 2022-09-22 NOTE — TELEPHONE ENCOUNTER
Katya Hyde  11:01 AM 9/22/2022                    Patient is following up on the [employer] message sent on 9/20. Per patient, pharmacy does not have the clonidine patch available. Patient is requesting a prescription for the pill form of clonidine. Patient's pharmacy has the pill form available and he would prefer taking a pill instead of using a patch.

## 2022-09-22 NOTE — TELEPHONE ENCOUNTER
Prescription for clonidine 0.1 mg tablet has been sent to the pharmacy. Please call the patient and let him know.

## 2022-10-15 DIAGNOSIS — R61 HYPERHIDROSIS: ICD-10-CM

## 2022-10-17 ENCOUNTER — OFFICE VISIT (OUTPATIENT)
Dept: DERMATOLOGY | Age: 32
End: 2022-10-17

## 2022-10-17 DIAGNOSIS — L74.510 HYPERHIDROSIS OF AXILLA: Primary | ICD-10-CM

## 2022-10-17 PROCEDURE — 64650 CHEMODENERV ECCRINE GLANDS: CPT | Performed by: DERMATOLOGY

## 2022-10-17 RX ORDER — CLONIDINE 0.1 MG/24H
PATCH, EXTENDED RELEASE TRANSDERMAL
COMMUNITY
Start: 2022-09-22 | End: 2023-08-11 | Stop reason: ALTCHOICE

## 2022-10-17 RX ORDER — CLONIDINE HYDROCHLORIDE 0.1 MG/1
TABLET ORAL
Qty: 60 TABLET | Refills: 0 | Status: SHIPPED | OUTPATIENT
Start: 2022-10-17

## 2022-10-17 NOTE — TELEPHONE ENCOUNTER
Please review. Protocol failed/ No protocol      Requested Prescriptions   Pending Prescriptions Disp Refills    CLONIDINE 0.1 MG Oral Tab [Pharmacy Med Name: CLONIDINE HCL 0.1 MG TABLET] 60 tablet 0     Sig: TAKE 1 TABLET BY MOUTH 2 TIMES DAILY. Hypertensive Medications Protocol Failed - 10/15/2022 12:30 PM        Failed - CMP or BMP in past 6 months     No results found for this or any previous visit (from the past 4392 hour(s)).               Failed - EGFRCR or GFRAA > 50     GFR Evaluation              Passed - In person appointment in the past 12 or next 3 months       Recent Outpatient Visits              1 month ago Establishing care with new doctor, encounter for    [address] [place]    Office Visit                 Passed - Last BP reading less than 140/90     BP Readings from Last 1 Encounters:  08/30/22 : 100/72                Passed - In person appointment or virtual visit in the past 6 months       Recent Outpatient Visits              1 month ago Establishing care with new doctor, encounter for    [address] [place], [place]     Office Visit                            Recent Outpatient Visits              1 month ago Establishing care with new doctor, encounter for    [address] [place]    Office Visit

## 2022-10-24 ENCOUNTER — TELEMEDICINE (OUTPATIENT)
Dept: FAMILY MEDICINE CLINIC | Facility: CLINIC | Age: 32
End: 2022-10-24

## 2022-10-24 DIAGNOSIS — U09.9 COVID-19 LONG HAULER MANIFESTING CHRONIC NEUROLOGIC SYMPTOMS: Primary | ICD-10-CM

## 2022-10-24 DIAGNOSIS — R61 HYPERHIDROSIS: ICD-10-CM

## 2022-10-24 DIAGNOSIS — R29.90 COVID-19 LONG HAULER MANIFESTING CHRONIC NEUROLOGIC SYMPTOMS: Primary | ICD-10-CM

## 2022-10-24 DIAGNOSIS — Z11.3 ROUTINE SCREENING FOR STI (SEXUALLY TRANSMITTED INFECTION): ICD-10-CM

## 2022-10-24 PROCEDURE — 99214 OFFICE O/P EST MOD 30 MIN: CPT | Performed by: FAMILY MEDICINE

## 2022-10-24 NOTE — PATIENT INSTRUCTIONS
Referred to neurology [facility] for covid. Patient may need immunity status titers for the childhood immunizations. For any adult immunizations that are needed that would be boosters with respect to all immunizations that are recommended at, we can provide access to those immunizations.

## 2022-11-01 ENCOUNTER — TELEPHONE (OUTPATIENT)
Dept: FAMILY MEDICINE CLINIC | Facility: CLINIC | Age: 32
End: 2022-11-01

## 2022-11-01 DIAGNOSIS — R61 HYPERHIDROSIS: Primary | ICD-10-CM

## 2022-11-01 NOTE — TELEPHONE ENCOUNTER
Patient calling with condition update regarding hyperhidrosis medications (see Patient Message 9/20/22)    Has been following up with Dr. Dominga Gonzalez, neurologist from [facility]      Patient was advised the following =   STOP clonidine PO, try the clonidine weekly patches to minimize side effects    Also recommended to \"try glycopyrrolate at a low dose. \"  Asked patient if the specialist provided more specific information for prescription    Patient states he was previously on glycopyrrolate 1mg TID \"a few months ago.   I never took it three times a day though\" and \"I believe that is the lowest dose\"        Patient asking for prescriptions for clonidine patches and glycopyrrolate (pharmacy verified)

## 2022-11-02 RX ORDER — GLYCOPYRROLATE 1 MG/1
1 TABLET ORAL 3 TIMES DAILY
Qty: 90 TABLET | Refills: 1 | Status: SHIPPED | OUTPATIENT
Start: 2022-11-02

## 2022-11-02 RX ORDER — CLONIDINE 0.2 MG/24H
1 PATCH, EXTENDED RELEASE TRANSDERMAL WEEKLY
Qty: 4 PATCH | Refills: 1 | Status: SHIPPED | OUTPATIENT
Start: 2022-11-02

## 2022-11-02 NOTE — TELEPHONE ENCOUNTER
Prescriptions have been sent to the pharmacy for both glycopyrrolate and also the clonidine patches. Please call the patient and let them know.

## 2022-11-04 ENCOUNTER — TELEPHONE (OUTPATIENT)
Dept: FAMILY MEDICINE CLINIC | Facility: CLINIC | Age: 32
End: 2022-11-04

## 2022-11-04 NOTE — TELEPHONE ENCOUNTER
Requested Allergy Additions Date Noted Reactions Reported By  Comments   Latex  HIVES, RASH, ITCHING [facility]    [id number]  HIVES, RASH, ITCHING [address] [place] [address]

## 2022-11-12 DIAGNOSIS — R61 HYPERHIDROSIS: ICD-10-CM

## 2022-11-15 RX ORDER — CLONIDINE HYDROCHLORIDE 0.1 MG/1
TABLET ORAL
Qty: 60 TABLET | Refills: 0 | Status: SHIPPED | OUTPATIENT
Start: 2022-11-15 | End: 2022-11-21

## 2022-11-19 ENCOUNTER — TELEPHONE (OUTPATIENT)
Dept: FAMILY MEDICINE CLINIC | Facility: CLINIC | Age: 32
End: 2022-11-19

## 2022-11-19 DIAGNOSIS — R61 HYPERHIDROSIS: ICD-10-CM

## 2022-11-21 RX ORDER — CLONIDINE HYDROCHLORIDE 0.1 MG/1
0.1 TABLET ORAL 2 TIMES DAILY
Qty: 180 TABLET | Refills: 1 | Status: SHIPPED | OUTPATIENT
Start: 2022-11-21

## 2022-11-22 DIAGNOSIS — R61 HYPERHIDROSIS: ICD-10-CM

## 2022-11-22 NOTE — TELEPHONE ENCOUNTER
Please review. Protocol failed / No protocol. Requested Prescriptions   Pending Prescriptions Disp Refills    CLONIDINE 0.2 MG/24HR Transdermal Patch Weekly [Pharmacy Med Name: CLONIDINE 0.2 MG/DAY PATCH] 4 patch 1     Sig: APPLY 1 PATCH ONCE A WEEK       Hypertensive Medications Protocol Failed - 11/22/2022  7:31 AM        Failed - CMP or BMP in past 6 months     No results found for this or any previous visit (from the past 4392 hour(s)).             Failed - EGFRCR or GFRAA > 50     GFR Evaluation            Passed - In person appointment in the past 12 or next 3 months     Recent Outpatient Visits              4 weeks ago COVID-19 long hauler manifesting chronic neurologic symptoms    Michele Worrell Jacki Rattan, DO    Telemedicine    2 months ago Establishing care with new doctor, encounter [place]    [place] [facility], [address] [place]    Office Visit                      Passed - Last BP reading less than 140/90     BP Readings from Last 1 Encounters:  08/30/22 : 100/72              Passed - In person appointment or virtual visit in the past 6 months     Recent Outpatient Visits              4 weeks ago COVID-19 long hauler manifesting chronic neurologic symptoms    Michele Worrell Jacki Rattan, DO    Telemedicine    2 months ago Establishing care with new doctor, encounter for    [place] [employer] [facility], Michele Hameed Jacki Rattan, DO    Office Visit                             Recent Outpatient Visits              4 weeks ago COVID-19 long hauler manifesting chronic neurologic symptoms    [place] [facility], Michele Hameed Jacki Rattan, DO    Telemedicine    2 months ago Establishing care with new doctor, encounter for    [place] [employer], Michele Hameed Jacki Rattan [place]    Office Visit

## 2022-11-25 RX ORDER — CLONIDINE 0.2 MG/24H
PATCH, EXTENDED RELEASE TRANSDERMAL
Qty: 4 PATCH | Refills: 1 | Status: SHIPPED | OUTPATIENT
Start: 2022-11-25

## 2023-01-11 ENCOUNTER — OFFICE VISIT (OUTPATIENT)
Dept: DERMATOLOGY | Age: 33
End: 2023-01-11

## 2023-01-11 DIAGNOSIS — L74.510 HYPERHIDROSIS OF AXILLA: Primary | ICD-10-CM

## 2023-01-11 PROCEDURE — 64650 CHEMODENERV ECCRINE GLANDS: CPT | Performed by: DERMATOLOGY

## 2023-01-18 DIAGNOSIS — R61 HYPERHIDROSIS: ICD-10-CM

## 2023-01-18 RX ORDER — CLONIDINE 0.2 MG/24H
1 PATCH, EXTENDED RELEASE TRANSDERMAL WEEKLY
Qty: 4 PATCH | Refills: 1 | Status: SHIPPED | OUTPATIENT
Start: 2023-01-18 | End: 2023-01-18

## 2023-01-18 RX ORDER — CLONIDINE 0.2 MG/24H
1 PATCH, EXTENDED RELEASE TRANSDERMAL WEEKLY
Qty: 12 PATCH | Refills: 1 | Status: SHIPPED | OUTPATIENT
Start: 2023-01-18

## 2023-01-18 NOTE — TELEPHONE ENCOUNTER
Please review. Protocol Failed or has No Protocol. Requested Prescriptions   Pending Prescriptions Disp Refills    CLONIDINE 0.2 MG/24HR Transdermal Patch Weekly [Pharmacy Med Name: CLONIDINE 0.2 MG/DAY PATCH] 4 patch 1     Sig: APPLY 1 PATCH ONCE A WEEK       Hypertensive Medications Protocol Failed - 1/18/2023 12:34 AM        Failed - CMP or BMP in past 6 months     No results found for this or any previous visit (from the past 4392 hour(s)).             Failed - EGFRCR or GFRAA > 50     GFR Evaluation            Passed - In person appointment in the past 12 or next 3 months     Recent Outpatient Visits              2 months ago COVID-19 long hauler manifesting chronic neurologic symptoms    [facility], [address] [place] Zayas [place], DO    Telemedicine    4 months ago Establishing care with new doctor, encounter for    Michele Anders [place], [place]    Office Visit                      Passed - Last BP reading less than 140/90     BP Readings from Last 1 Encounters:  08/30/22 : 100/72              Passed - In person appointment or virtual visit in the past 6 months     Recent Outpatient Visits              2 months ago COVID-19 long hauler manifesting chronic neurologic symptoms    Michele Anders Laury [place], DO    Telemedicine    4 months ago Establishing care with new doctor, encounter for    Michele Anders Laury Ek,     Office Visit                               Recent Outpatient Visits              2 months ago COVID-19 long hauler manifesting chronic neurologic symptoms    Michele Anders Laury Ek,     Telemedicine    4 months ago Establishing care with new doctor, encounter for    Michele Anders [place], [place]    Office Visit

## 2023-01-18 NOTE — TELEPHONE ENCOUNTER
Please review. Protocol failed or has no protocol. Patient's insurance covers a 90 day supply not a 30 day supply. Requested Prescriptions   Pending Prescriptions Disp Refills    cloNIDine 0.2 MG/24HR Transdermal Patch Weekly 12 patch 1     Sig: Place 1 patch onto the skin once a week. Hypertensive Medications Protocol Failed - 1/18/2023  3:51 PM        Failed - CMP or BMP in past 6 months     No results found for this or any previous visit (from the past 4392 hour(s)).             Failed - EGFRCR or GFRAA > 50     GFR Evaluation            Passed - In person appointment in the past 12 or next 3 months     Recent Outpatient Visits              2 months ago COVID-19 long hauler manifesting chronic neurologic symptoms    [facility], [address] Barbara Bautista,     Telemedicine    4 months ago Establishing care with new doctor, encounter [facility]    [facility]    [facility]                      Passed - Last BP reading less than 140/90     BP Readings from Last 1 Encounters:  08/30/22 : 100/72              Passed - In person appointment or virtual visit in the past 6 months     Recent Outpatient Visits              2 months ago COVID-19 long hauler manifesting chronic neurologic symptoms    Michele Beltr[facility] Donna Luo,     Telemedicine    4 months ago Establishing care with new doctor, encounter for    Michele [address] Donna Luo, DO    Office Visit                           Recent Outpatient Visits              2 months ago COVID-19 long hauler manifesting chronic neurologic symptoms    Michele Beltrán Donna Luo, DO    Telemedicine    4 months ago Establishing care with new doctor, encounter for    Michele [address]tr[address] [place] Office Visit

## 2023-01-26 ENCOUNTER — TELEPHONE (OUTPATIENT)
Dept: INTERNAL MEDICINE | Age: 33
End: 2023-01-26

## 2023-01-26 ENCOUNTER — LAB SERVICES (OUTPATIENT)
Dept: LAB | Age: 33
End: 2023-01-26

## 2023-01-26 DIAGNOSIS — Z02.0 SCHOOL PHYSICAL EXAM: Primary | ICD-10-CM

## 2023-01-26 DIAGNOSIS — Z02.0 SCHOOL PHYSICAL EXAM: ICD-10-CM

## 2023-01-26 PROCEDURE — 86765 RUBEOLA ANTIBODY: CPT | Performed by: INTERNAL MEDICINE

## 2023-01-26 PROCEDURE — 86735 MUMPS ANTIBODY: CPT | Performed by: INTERNAL MEDICINE

## 2023-01-26 PROCEDURE — 36415 COLL VENOUS BLD VENIPUNCTURE: CPT | Performed by: INTERNAL MEDICINE

## 2023-01-26 PROCEDURE — 86762 RUBELLA ANTIBODY: CPT | Performed by: INTERNAL MEDICINE

## 2023-01-27 ENCOUNTER — TELEPHONE (OUTPATIENT)
Dept: INTERNAL MEDICINE | Age: 33
End: 2023-01-27

## 2023-01-27 LAB
MEV IGG SER QL IA: NORMAL
MUV IGG SER IA-ACNC: 1.73 OD RATIO
RUBV IGG SERPL IA-ACNC: 21.1 UNITS/ML

## 2023-01-31 ENCOUNTER — TELEPHONE (OUTPATIENT)
Dept: INTERNAL MEDICINE | Age: 33
End: 2023-01-31

## 2023-01-31 ENCOUNTER — TELEPHONE (OUTPATIENT)
Dept: FAMILY MEDICINE CLINIC | Facility: CLINIC | Age: 33
End: 2023-01-31

## 2023-01-31 NOTE — TELEPHONE ENCOUNTER
Spoke to patient regarding his forms. Informed patient we cannot estimate when childhood vaccines would have been given. Pt stated he only wanted the TDap that was on file written on the form. Pt stated he received his titers and new tdap vaccine elsewhere and had that office fill that portion out of form. Informed pt that tdap vaccine from 1/1/11 would be the only section filled out on form as well as provider's signature. Pt understood. Form was signed by Dr. Kristian Lagunas and pt will  on Thursday 2/2.

## 2023-01-31 NOTE — TELEPHONE ENCOUNTER
Please see Julep message from 1/23. Patient attached a vaccination form that needs to be filled out. Forms are due by 2/2. Patient has not been able to find a record of his childhood vaccines. Per patient, the forms allow a provider to estimate the dates patient would have received the required vaccinations. Patient is requesting a call back to confirm the forms will be filled out.

## 2023-04-12 ENCOUNTER — OFFICE VISIT (OUTPATIENT)
Dept: DERMATOLOGY | Age: 33
End: 2023-04-12

## 2023-04-12 DIAGNOSIS — L74.510 HYPERHIDROSIS OF AXILLA: Primary | ICD-10-CM

## 2023-04-12 PROCEDURE — 64650 CHEMODENERV ECCRINE GLANDS: CPT | Performed by: DERMATOLOGY

## 2023-05-08 ENCOUNTER — TELEPHONE (OUTPATIENT)
Dept: DERMATOLOGY | Age: 33
End: 2023-05-08

## 2023-05-08 DIAGNOSIS — L74.510 HYPERHIDROSIS OF AXILLA: Primary | ICD-10-CM

## 2023-06-26 ENCOUNTER — OFFICE VISIT (OUTPATIENT)
Dept: DERMATOLOGY | Age: 33
End: 2023-06-26

## 2023-06-26 DIAGNOSIS — L74.510 HYPERHIDROSIS OF AXILLA: Primary | ICD-10-CM

## 2023-06-26 PROCEDURE — 64650 CHEMODENERV ECCRINE GLANDS: CPT | Performed by: DERMATOLOGY

## 2023-07-29 ENCOUNTER — PATIENT MESSAGE (OUTPATIENT)
Dept: FAMILY MEDICINE CLINIC | Facility: CLINIC | Age: 33
End: 2023-07-29

## 2023-08-11 ENCOUNTER — OFFICE VISIT (OUTPATIENT)
Dept: INTERNAL MEDICINE | Age: 33
End: 2023-08-11

## 2023-08-11 ENCOUNTER — LAB SERVICES (OUTPATIENT)
Dept: LAB | Age: 33
End: 2023-08-11

## 2023-08-11 VITALS
TEMPERATURE: 97.8 F | HEART RATE: 59 BPM | WEIGHT: 175.2 LBS | BODY MASS INDEX: 24.44 KG/M2 | SYSTOLIC BLOOD PRESSURE: 128 MMHG | DIASTOLIC BLOOD PRESSURE: 70 MMHG

## 2023-08-11 DIAGNOSIS — Z11.3 SCREENING FOR STDS (SEXUALLY TRANSMITTED DISEASES): ICD-10-CM

## 2023-08-11 DIAGNOSIS — K58.1 IRRITABLE BOWEL SYNDROME WITH CONSTIPATION: Primary | ICD-10-CM

## 2023-08-11 LAB — HIV 1+2 AB+HIV1 P24 AG SERPL QL IA: NONREACTIVE

## 2023-08-11 PROCEDURE — 36415 COLL VENOUS BLD VENIPUNCTURE: CPT | Performed by: INTERNAL MEDICINE

## 2023-08-11 PROCEDURE — 87491 CHLMYD TRACH DNA AMP PROBE: CPT | Performed by: INTERNAL MEDICINE

## 2023-08-11 PROCEDURE — 87389 HIV-1 AG W/HIV-1&-2 AB AG IA: CPT | Performed by: INTERNAL MEDICINE

## 2023-08-11 PROCEDURE — 87661 TRICHOMONAS VAGINALIS AMPLIF: CPT | Performed by: INTERNAL MEDICINE

## 2023-08-11 PROCEDURE — 87591 N.GONORRHOEAE DNA AMP PROB: CPT | Performed by: INTERNAL MEDICINE

## 2023-08-11 PROCEDURE — 99213 OFFICE O/P EST LOW 20 MIN: CPT | Performed by: INTERNAL MEDICINE

## 2023-08-11 RX ORDER — RUFINAMIDE 400 MG/1
400 TABLET, FILM COATED ORAL
COMMUNITY
Start: 2023-06-16 | End: 2024-06-15

## 2023-08-11 RX ORDER — GUANFACINE 1 MG/1
1 TABLET, EXTENDED RELEASE ORAL DAILY
Qty: 30 TABLET | Refills: 11 | Status: SHIPPED | OUTPATIENT
Start: 2023-08-11 | End: 2024-08-05

## 2023-08-11 RX ORDER — GUANFACINE 1 MG/1
1 TABLET, EXTENDED RELEASE ORAL DAILY
COMMUNITY
Start: 2023-07-18 | End: 2023-08-11 | Stop reason: SDUPTHER

## 2023-08-14 ENCOUNTER — E-ADVICE (OUTPATIENT)
Dept: INTERNAL MEDICINE | Age: 33
End: 2023-08-14

## 2023-08-16 ENCOUNTER — LAB REQUISITION (OUTPATIENT)
Dept: LAB | Age: 33
End: 2023-08-16

## 2023-08-16 ENCOUNTER — LAB SERVICES (OUTPATIENT)
Dept: LAB | Age: 33
End: 2023-08-16

## 2023-08-16 DIAGNOSIS — Z13.9 ENCOUNTER FOR SCREENING, UNSPECIFIED: ICD-10-CM

## 2023-08-16 PROCEDURE — PSEU8201 HERPES SIMPLEX VIRUS 1 GLYCOPROTEIN: Performed by: CLINICAL MEDICAL LABORATORY

## 2023-08-16 PROCEDURE — PSEU8528 HEPATITIS C ANTIBODY WITH REFLEX: Performed by: CLINICAL MEDICAL LABORATORY

## 2023-08-16 PROCEDURE — 86592 SYPHILIS TEST NON-TREP QUAL: CPT | Performed by: CLINICAL MEDICAL LABORATORY

## 2023-08-16 PROCEDURE — 86709 HEPATITIS A IGM ANTIBODY: CPT | Performed by: CLINICAL MEDICAL LABORATORY

## 2023-08-16 PROCEDURE — 87340 HEPATITIS B SURFACE AG IA: CPT | Performed by: CLINICAL MEDICAL LABORATORY

## 2023-08-16 PROCEDURE — PSEU8482 HEPATITIS B SURFACE ANTIGEN: Performed by: CLINICAL MEDICAL LABORATORY

## 2023-08-16 PROCEDURE — 87491 CHLMYD TRACH DNA AMP PROBE: CPT | Performed by: CLINICAL MEDICAL LABORATORY

## 2023-08-16 PROCEDURE — 86696 HERPES SIMPLEX TYPE 2 TEST: CPT | Performed by: CLINICAL MEDICAL LABORATORY

## 2023-08-16 PROCEDURE — 87389 HIV-1 AG W/HIV-1&-2 AB AG IA: CPT | Performed by: CLINICAL MEDICAL LABORATORY

## 2023-08-16 PROCEDURE — 87661 TRICHOMONAS VAGINALIS AMPLIF: CPT | Performed by: CLINICAL MEDICAL LABORATORY

## 2023-08-16 PROCEDURE — 86695 HERPES SIMPLEX TYPE 1 TEST: CPT | Performed by: CLINICAL MEDICAL LABORATORY

## 2023-08-16 PROCEDURE — PSEU9964 TRICHOMONAS VAGINALIS BY NUCLEIC ACID AMPLIFICATION: Performed by: CLINICAL MEDICAL LABORATORY

## 2023-08-16 PROCEDURE — PSEU9913 CHLAMYDIA/GONORRHEA BY NUCLEIC ACID AMPLIFICATION: Performed by: CLINICAL MEDICAL LABORATORY

## 2023-08-16 PROCEDURE — PSEU8483 HIV 1/HIV 2 ANTIGEN/ANTIBODY SCREEN: Performed by: CLINICAL MEDICAL LABORATORY

## 2023-08-16 PROCEDURE — PSEU8570 RPR (RAPID PLASMA REAGIN) TRADITIONAL SYPHILIS SCREEN ALGORITHM: Performed by: CLINICAL MEDICAL LABORATORY

## 2023-08-16 PROCEDURE — 87591 N.GONORRHOEAE DNA AMP PROB: CPT | Performed by: CLINICAL MEDICAL LABORATORY

## 2023-08-16 PROCEDURE — PSEU8107 HERPES SIMPLEX VIRUS 2 GLYCOPROTEIN: Performed by: CLINICAL MEDICAL LABORATORY

## 2023-08-16 PROCEDURE — PSEU8524 HEPATITIS A ANTIBODY IGM: Performed by: CLINICAL MEDICAL LABORATORY

## 2023-08-16 PROCEDURE — 86803 HEPATITIS C AB TEST: CPT | Performed by: CLINICAL MEDICAL LABORATORY

## 2023-08-17 LAB
C TRACH RRNA UR QL NAA+PROBE: NEGATIVE
HAV IGM SER QL: NEGATIVE
HBV SURFACE AG SER QL: NEGATIVE
HCV AB SER QL: NEGATIVE
HIV 1+2 AB+HIV1 P24 AG SERPL QL IA: NONREACTIVE
Lab: NORMAL
N GONORRHOEA RRNA UR QL NAA+PROBE: NEGATIVE
RPR SER QL: NONREACTIVE
T VAGINALIS RRNA UR QL NAA+PROBE: NEGATIVE

## 2023-08-18 LAB
HSV1 GG IGG SER-ACNC: POSITIVE
HSV2 GG IGG SER-ACNC: NEGATIVE

## 2023-09-20 ENCOUNTER — TELEPHONE (OUTPATIENT)
Dept: DERMATOLOGY | Age: 33
End: 2023-09-20

## 2023-09-28 ENCOUNTER — OFFICE VISIT (OUTPATIENT)
Dept: DERMATOLOGY | Age: 33
End: 2023-09-28

## 2023-09-28 DIAGNOSIS — L74.510 HYPERHIDROSIS OF AXILLA: Primary | ICD-10-CM

## 2023-09-28 PROCEDURE — 64650 CHEMODENERV ECCRINE GLANDS: CPT | Performed by: DERMATOLOGY

## 2024-01-11 ENCOUNTER — APPOINTMENT (OUTPATIENT)
Dept: DERMATOLOGY | Age: 34
End: 2024-01-11

## 2024-01-11 DIAGNOSIS — L74.510 HYPERHIDROSIS OF AXILLA: Primary | ICD-10-CM

## 2024-01-11 PROCEDURE — 64650 CHEMODENERV ECCRINE GLANDS: CPT | Performed by: DERMATOLOGY

## 2024-01-30 ENCOUNTER — E-ADVICE (OUTPATIENT)
Dept: INTERNAL MEDICINE | Age: 34
End: 2024-01-30

## 2024-01-30 DIAGNOSIS — N34.2 URETHRITIS: Primary | ICD-10-CM

## 2024-01-31 ENCOUNTER — EXTERNAL LAB (OUTPATIENT)
Dept: INTERNAL MEDICINE | Age: 34
End: 2024-01-31

## 2024-01-31 LAB
C TRACH RRNA SPEC QL NAA+PROBE: NEGATIVE
HIV 1+2 AB+HIV1 P24 AG SERPL QL IA: NON REACTIVE
N GONORRHOEA RRNA SPEC QL NAA+PROBE: NEGATIVE
RPR SER QL: NON REACTIVE

## 2024-02-05 ENCOUNTER — LAB SERVICES (OUTPATIENT)
Dept: URGENT CARE | Age: 34
End: 2024-02-05

## 2024-02-05 ENCOUNTER — TELEPHONE (OUTPATIENT)
Dept: INTERNAL MEDICINE | Age: 34
End: 2024-02-05

## 2024-02-05 ENCOUNTER — APPOINTMENT (OUTPATIENT)
Dept: URGENT CARE | Age: 34
End: 2024-02-05

## 2024-02-05 DIAGNOSIS — N34.2 URETHRITIS: Primary | ICD-10-CM

## 2024-02-05 DIAGNOSIS — N34.2 URETHRITIS: ICD-10-CM

## 2024-02-05 PROCEDURE — 87661 TRICHOMONAS VAGINALIS AMPLIF: CPT | Performed by: CLINICAL MEDICAL LABORATORY

## 2024-02-06 ENCOUNTER — APPOINTMENT (OUTPATIENT)
Dept: URGENT CARE | Age: 34
End: 2024-02-06

## 2024-02-06 ENCOUNTER — E-ADVICE (OUTPATIENT)
Dept: INTERNAL MEDICINE | Age: 34
End: 2024-02-06

## 2024-02-06 DIAGNOSIS — Z11.3 SCREENING EXAMINATION FOR STD (SEXUALLY TRANSMITTED DISEASE): Primary | ICD-10-CM

## 2024-02-06 LAB — T VAGINALIS RRNA UR QL NAA+PROBE: NEGATIVE

## 2024-02-07 ENCOUNTER — LAB SERVICES (OUTPATIENT)
Dept: LAB | Age: 34
End: 2024-02-07

## 2024-02-07 DIAGNOSIS — Z11.3 SCREENING EXAMINATION FOR STD (SEXUALLY TRANSMITTED DISEASE): ICD-10-CM

## 2024-02-07 PROCEDURE — 87491 CHLMYD TRACH DNA AMP PROBE: CPT | Performed by: CLINICAL MEDICAL LABORATORY

## 2024-02-07 PROCEDURE — 87563 M. GENITALIUM AMP PROBE: CPT | Performed by: CLINICAL MEDICAL LABORATORY

## 2024-02-07 PROCEDURE — 87591 N.GONORRHOEAE DNA AMP PROB: CPT | Performed by: CLINICAL MEDICAL LABORATORY

## 2024-02-08 LAB
APPEARANCE UR: CLEAR
BACTERIA #/AREA URNS HPF: ABNORMAL /HPF
BILIRUB UR QL STRIP: NEGATIVE
COLOR UR: ABNORMAL
GLUCOSE UR STRIP-MCNC: NEGATIVE MG/DL
HGB UR QL STRIP: NEGATIVE
HYALINE CASTS #/AREA URNS LPF: ABNORMAL /LPF
KETONES UR STRIP-MCNC: NEGATIVE MG/DL
LEUKOCYTE ESTERASE UR QL STRIP: ABNORMAL
M GENITALIUM DNA SPEC QL NAA+PROBE: NOT DETECTED
MUCOUS THREADS URNS QL MICRO: PRESENT
NITRITE UR QL STRIP: NEGATIVE
PH UR STRIP: 6.5 [PH] (ref 5–7)
PROT UR STRIP-MCNC: NEGATIVE MG/DL
RBC #/AREA URNS HPF: ABNORMAL /HPF
SERVICE CMNT-IMP: NORMAL
SP GR UR STRIP: 1.02 (ref 1–1.03)
SQUAMOUS #/AREA URNS HPF: ABNORMAL /HPF
UROBILINOGEN UR STRIP-MCNC: 0.2 MG/DL
WBC #/AREA URNS HPF: ABNORMAL /HPF

## 2024-02-09 ENCOUNTER — TELEPHONE (OUTPATIENT)
Dept: INTERNAL MEDICINE | Age: 34
End: 2024-02-09

## 2024-02-09 LAB — BACTERIA UR CULT: NO GROWTH

## 2024-02-12 ENCOUNTER — TELEPHONE (OUTPATIENT)
Dept: INTERNAL MEDICINE | Age: 34
End: 2024-02-12

## 2024-02-12 DIAGNOSIS — N34.2 URETHRITIS: Primary | ICD-10-CM

## 2024-02-12 LAB
C TRACH RRNA UR QL NAA+PROBE: NEGATIVE
Lab: NORMAL
N GONORRHOEA RRNA UR QL NAA+PROBE: NEGATIVE

## 2024-02-26 ENCOUNTER — TELEPHONE (OUTPATIENT)
Dept: INTERNAL MEDICINE | Age: 34
End: 2024-02-26

## 2024-03-21 ENCOUNTER — TELEPHONE (OUTPATIENT)
Dept: DERMATOLOGY | Age: 34
End: 2024-03-21

## 2024-03-25 ENCOUNTER — E-ADVICE (OUTPATIENT)
Dept: DERMATOLOGY | Age: 34
End: 2024-03-25

## 2024-04-15 ENCOUNTER — APPOINTMENT (OUTPATIENT)
Dept: DERMATOLOGY | Age: 34
End: 2024-04-15

## 2024-05-17 ENCOUNTER — E-ADVICE (OUTPATIENT)
Dept: INTERNAL MEDICINE | Age: 34
End: 2024-05-17

## 2024-05-17 DIAGNOSIS — Z11.3 SCREENING EXAMINATION FOR STD (SEXUALLY TRANSMITTED DISEASE): Primary | ICD-10-CM

## 2024-05-30 ENCOUNTER — E-ADVICE (OUTPATIENT)
Dept: INTERNAL MEDICINE | Age: 34
End: 2024-05-30

## 2024-05-30 RX ORDER — ERYTHROMYCIN 5 MG/G
0.5 OINTMENT OPHTHALMIC 4 TIMES DAILY
Qty: 3.5 G | Refills: 0 | Status: SHIPPED | OUTPATIENT
Start: 2024-05-30 | End: 2024-05-30 | Stop reason: SDUPTHER

## 2024-05-30 RX ORDER — ERYTHROMYCIN 5 MG/G
0.5 OINTMENT OPHTHALMIC 4 TIMES DAILY
Qty: 3.5 G | Refills: 0 | Status: SHIPPED | OUTPATIENT
Start: 2024-05-30

## 2024-07-29 ENCOUNTER — TELEPHONE (OUTPATIENT)
Dept: DERMATOLOGY | Age: 34
End: 2024-07-29

## 2024-08-31 ENCOUNTER — V-VISIT (OUTPATIENT)
Dept: FAMILY MEDICINE | Age: 34
End: 2024-08-31

## 2024-08-31 ENCOUNTER — TELEPHONE (OUTPATIENT)
Dept: INTERNAL MEDICINE | Age: 34
End: 2024-08-31

## 2024-08-31 DIAGNOSIS — Z11.3 ROUTINE SCREENING FOR STI (SEXUALLY TRANSMITTED INFECTION): Primary | ICD-10-CM

## 2024-09-01 ENCOUNTER — OFFICE VISIT (OUTPATIENT)
Dept: URGENT CARE | Age: 34
End: 2024-09-01

## 2024-09-01 DIAGNOSIS — Z11.3 ROUTINE SCREENING FOR STI (SEXUALLY TRANSMITTED INFECTION): Primary | ICD-10-CM

## 2024-09-01 PROCEDURE — 87491 CHLMYD TRACH DNA AMP PROBE: CPT | Performed by: CLINICAL MEDICAL LABORATORY

## 2024-09-01 PROCEDURE — 87563 M. GENITALIUM AMP PROBE: CPT | Performed by: CLINICAL MEDICAL LABORATORY

## 2024-09-01 PROCEDURE — 99203 OFFICE O/P NEW LOW 30 MIN: CPT | Performed by: NURSE PRACTITIONER

## 2024-09-01 PROCEDURE — 87591 N.GONORRHOEAE DNA AMP PROB: CPT | Performed by: CLINICAL MEDICAL LABORATORY

## 2024-09-01 PROCEDURE — 87661 TRICHOMONAS VAGINALIS AMPLIF: CPT | Performed by: CLINICAL MEDICAL LABORATORY

## 2024-09-03 LAB
C TRACH RRNA UR QL NAA+PROBE: NEGATIVE
Lab: NORMAL
M GENITALIUM DNA SPEC QL NAA+PROBE: NOT DETECTED
N GONORRHOEA RRNA UR QL NAA+PROBE: NEGATIVE
SERVICE CMNT-IMP: NORMAL
T VAGINALIS RRNA UR QL NAA+PROBE: NEGATIVE

## (undated) DEVICE — MMIS - FORCEPS BIOPSY NDL 240CM 2.2MM RJ 4 2.8MM STD

## (undated) DEVICE — MMIS - KIT PROCEDURE GI